# Patient Record
Sex: FEMALE | Race: WHITE | NOT HISPANIC OR LATINO | Employment: PART TIME | URBAN - METROPOLITAN AREA
[De-identification: names, ages, dates, MRNs, and addresses within clinical notes are randomized per-mention and may not be internally consistent; named-entity substitution may affect disease eponyms.]

---

## 2017-10-05 ENCOUNTER — TELEPHONE (OUTPATIENT)
Dept: FAMILY MEDICINE | Facility: CLINIC | Age: 52
End: 2017-10-05

## 2023-06-02 ENCOUNTER — TRANSFERRED RECORDS (OUTPATIENT)
Dept: HEALTH INFORMATION MANAGEMENT | Facility: CLINIC | Age: 58
End: 2023-06-02

## 2023-06-09 ENCOUNTER — TELEPHONE (OUTPATIENT)
Dept: OPHTHALMOLOGY | Facility: CLINIC | Age: 58
End: 2023-06-09

## 2023-06-09 NOTE — TELEPHONE ENCOUNTER
Pt. Was seen this week and small choroidal lesion noted 2 years ago had grown significantly. Was told to be seen by specialist ASAP. Coming Monday at 9:30.    Bindu Pillai COT 11:55 AM June 9, 2023

## 2023-06-09 NOTE — TELEPHONE ENCOUNTER
[facility] Call Center    Phone Message    May a detailed message be left on voicemail: yes     Reason for Call: Appointment Intake    Referring Provider Name: Marc Garcia  Diagnosis and/or Symptoms: Cancer in left eye, possible melanoma.    Pt doesn't have a direct referral but provider insisted she reach out to us.    Per protocol writer is to send te    Action Taken: Message routed to:  [facility] eye    Travel Screening: Not Applicable

## 2023-06-12 ENCOUNTER — PATIENT OUTREACH (OUTPATIENT)
Dept: ONCOLOGY | Facility: CLINIC | Age: 58
End: 2023-06-12

## 2023-06-12 ENCOUNTER — MYC MEDICAL ADVICE (OUTPATIENT)
Dept: OPHTHALMOLOGY | Facility: CLINIC | Age: 58
End: 2023-06-12

## 2023-06-12 ENCOUNTER — OFFICE VISIT (OUTPATIENT)
Dept: OPHTHALMOLOGY | Facility: CLINIC | Age: 58
End: 2023-06-12
Attending: OPHTHALMOLOGY
Payer: COMMERCIAL

## 2023-06-12 DIAGNOSIS — D31.32 CHOROIDAL NEVUS, LEFT EYE: Primary | ICD-10-CM

## 2023-06-12 DIAGNOSIS — C69.32 MALIGNANT MELANOMA OF CHOROID OF LEFT EYE (H): ICD-10-CM

## 2023-06-12 PROCEDURE — G0463 HOSPITAL OUTPT CLINIC VISIT: HCPCS | Performed by: OPHTHALMOLOGY

## 2023-06-12 PROCEDURE — 76510 OPH US DX B-SCAN&QUAN A-SCAN: CPT | Performed by: OPHTHALMOLOGY

## 2023-06-12 PROCEDURE — 99204 OFFICE O/P NEW MOD 45 MIN: CPT | Performed by: OPHTHALMOLOGY

## 2023-06-12 PROCEDURE — 99207 FUNDUS PHOTOS OU (BOTH EYES): CPT | Mod: 26 | Performed by: OPHTHALMOLOGY

## 2023-06-12 PROCEDURE — 92250 FUNDUS PHOTOGRAPHY W/I&R: CPT | Performed by: OPHTHALMOLOGY

## 2023-06-12 PROCEDURE — 92134 CPTRZ OPH DX IMG PST SGM RTA: CPT | Performed by: OPHTHALMOLOGY

## 2023-06-12 ASSESSMENT — CONF VISUAL FIELD
OD_SUPERIOR_TEMPORAL_RESTRICTION: 0
OS_INFERIOR_TEMPORAL_RESTRICTION: 3
OD_NORMAL: 1
OD_INFERIOR_TEMPORAL_RESTRICTION: 0
OD_INFERIOR_NASAL_RESTRICTION: 0
METHOD: COUNTING FINGERS
OD_SUPERIOR_NASAL_RESTRICTION: 0

## 2023-06-12 ASSESSMENT — SLIT LAMP EXAM - LIDS
COMMENTS: NORMAL
COMMENTS: NORMAL

## 2023-06-12 ASSESSMENT — REFRACTION_WEARINGRX
OD_SPHERE: -3.00
OD_CYLINDER: +0.25
OS_CYLINDER: +0.50
OD_AXIS: 173
OS_SPHERE: -2.75
OS_AXIS: 011

## 2023-06-12 ASSESSMENT — TONOMETRY
OD_IOP_MMHG: 19
IOP_METHOD: TONOPEN
OS_IOP_MMHG: 17

## 2023-06-12 ASSESSMENT — EXTERNAL EXAM - RIGHT EYE: OD_EXAM: NORMAL

## 2023-06-12 ASSESSMENT — CUP TO DISC RATIO
OS_RATIO: 0.3
OD_RATIO: 0.3

## 2023-06-12 ASSESSMENT — VISUAL ACUITY
CORRECTION_TYPE: GLASSES
OD_CC: 20/20
METHOD: SNELLEN - LINEAR
OS_CC: 20/20

## 2023-06-12 ASSESSMENT — EXTERNAL EXAM - LEFT EYE: OS_EXAM: NORMAL

## 2023-06-12 NOTE — NURSING NOTE
Chief Complaints and History of Present Illnesses   Patient presents with     Retinal Evaluation     Referral per  PCP Julissa Batista MD and [facility], [address] for Cancer in left eye, possible melanoma.      Chief Complaint(s) and History of Present Illness(es)     Retinal Evaluation            Laterality: both eyes    Associated symptoms: Negative for eye pain, redness, tearing, photophobia, flashes, floaters and swelling    Treatments tried: no treatments    Pain scale: 0/10    Comments: Referral per  PCP Julissa Batista MD and [facility], [address] for Cancer in left eye, possible melanoma.          Comments    Eye meds:   NIHARIKA was 1 week ago, scans show growth of spot on left eye from 2 years previous.  No visual problems per patient.    JOSEPH Smith 6/12/2023 9:43 AM

## 2023-06-12 NOTE — PROGRESS NOTES
New Patient Oncology Nurse Navigator Note     Referring provider:     Madonna Corey MD  [facility]     Referred to (specialty): Medical Oncology & Radiation Oncology (Gamma Knife)    Requested provider (if applicable):   Dr. 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     Clinical History (per Nurse review of records provided):      RETINAL IMAGING:  OCT  06/12/23   OD - macula normal, PHF attached  OS - mild SRF SN macula, PHF attached              periphery - elevated lesion with SRF, choroid abnormal juxtafoveal      U/S OS  A-scan - medium reflective  B-scan - elevated lesion no extension, +choroidal excavation,  size 4.17mm x 11.58T x 10.33L (06/12/23)    Patient being referred for both medical oncology and Gamma knife  Records Location (Care Everywhere, Media, etc.):   Epic      Additional testing needed prior to consult:   CBC d/plts  CMP  CT-Chest/Abdomen/Pelvis with Contrast (CT-CAP)    HOLD: Dr. Schmitt, 7/6/23, [id number], NEW, VIDEO  IB to Gamma Knife team     6/13/23  I called Porsha to discuss referral to medical oncology and Gamma Knife.  I reviewed with her that a CT-CAP and labs are needed prior to her appointment with Dr. Schmitt.  I let her know that he is currently out of the office for the next 2 weeks, but our team will be reaching out early the week of June 26, to get these scheduled.  Plan for her to see Dr. Schmitt on 7/6/23 @ 1287, via video.  I reviewed what to expect at this visit.  I also sent her an email with our clinic location and phone numbers.    She is also being referred to Gamma Knife.  I let her know they will be reaching out separately to discuss appointment with Dr. Delgado.  Porsha has no other questions at this time.      I have sent an in basket message to Dr. Schmitt, and our melanoma navigator, Stormy, to obtain the orders for CT-CAP,  CBC d/p, and CMP, to be done prior to 7/6/23.  I plan to await orders before sending referral on to scheduling to finalize appointment.    6/15/23  Dr. Schmitt placed orders needed.  I forwarded on scheduling instructions to new patient scheduling to complete.  Gamma Knife indicates they will schedule for that appointment.

## 2023-06-12 NOTE — PROGRESS NOTES
"CC -   CMM OS    INTERVAL HISTORY - Initial visit with     [facility] -   Porsha AIKEN Darien is a  58 year old year-old patient with history of CMM OS diagnosed 2023    Had eye exam in 5/2021 showing nevus OS, returned 5/2023 and lesion much larger  No vision symptoms  Recalls was told about \"shadow\" in eye in years past as well    No DM, no HTn, no prior CA    PAST OCULAR SURGERY  None      RETINAL IMAGING:  OCT  06/12/23   OD - macula normal, PHF attached  OS - mild SRF SN macula, PHF attached   periphery - elevated lesion with SRF, choroid abnormal juxtafoveal      U/S OS  A-scan - medium reflective  B-scan - elevated lesion no extension, +choroidal excavation,  size 4.17mm x 11.58T x 10.33L (06/12/23)      ASSESSMENT & PLAN    # CMM OS   - very likely based on imaging   - substantial size growth 2021 to 2023 (images viewed on patient's phone, will upload to [facility])   - size 4 mm, +orange, +SRF     - juxtapapillary, on OCT choroidal abnormalities very close to ON   - would favor  gamma knife instead of plaque Tx d/t juxtapapillary location   - would consider tumor biopsy prior to GK     - r/b/a enucleation vs GK and biopsy d/w patient as below:    I discussed choroidal malignant melanomas with the patient and the treatment options. I explained that the COM Study showed that the long-term survival rate was equivalent with brachytherapy or enucleation and that many patients chose brachytherapy so that they could keep their eye. I explained that gamma knife radiation was also an option  and that the success rate was equivalent to brachytherapy. I explained that with radiation therapy there was a 90-95 percent success rate of making the tumor stop growing. I explained that if the tumor did not respond to the radiation then enucleation would probably be the best option. I explained that treatment did improve survivability and that long-term life expectancy would be determined by whether the tumor metastasized or not. I explained " that once metastasis occurred that the life expectancy subsequently was poor and that the patient would need monitoring to look for metastases subsequent to treatment. I advised the patient that the average survival at 10 to 15 years was 50 percent. I explained that with radiation treatment the patient would need to return indefinitely for serial visits to ensure that the tumor did not resume growing. I explained that with the radiation therapy there could be damage to the lens, the optic nerve and to the macula that could in the long run cause severe vision loss. I explained that this vision loss typically manifested within one to two years after treatment, but could manifest much sooner  I did offer the patient the possibility of a biopsy of the tumor before gamma knife, intraoperatively during plaque placement or after enucleation. I said this could predict the long-term prognosis more precisely, help tailor monitoring after treatment,  and possibly allow participation in clinical studies if the patient was interested. I did explain that currently there are no proven treatments should his biopsy results come back positive for an aggressive tumor.  I explained that with a biopsy, there was a risk of external tumor seeding which might require exenteration, a very disfiguring surgery. I explained a biopsy could cause a retinal detachment or vitreous hemorrhage, which could result in permanent severe vision loss because they would not be repaired for up to a year  until the tumor was seen to have responded to radiation.   I explained that as part of the plaque placement, it might be necessary to relocate extraocular muscles.  I explained that this could sometimes cause diplopia, which, while usually only temporary, could sometimes require special glasses, surgery, or even become permanent.  I explained that this is a training facility, and residents or fellows might participate in the surgery under  supervision.          ATTESTATION     Attending Physician Attestation:      Complete documentation of historical and exam elements from today's encounter can be found in the full encounter summary report (not reduplicated in this progress note).  I personally obtained the chief complaint(s) and history of present illness.  I confirmed and edited as necessary the review of systems, past medical/surgical history, family history, social history, and examination findings as documented by others; and I examined the patient myself.  I personally reviewed the relevant tests, images, and reports as documented above.  I formulated and edited as necessary the assessment and plan and discussed the findings and management plan with the patient and family    Madonna Corey MD, PhD  , Vitreoretinal Surgery  Department of Ophthalmology  [facility]

## 2023-06-14 DIAGNOSIS — C69.40 MALIGNANT MELANOMA OF UVEA, UNSPECIFIED LATERALITY (H): Primary | ICD-10-CM

## 2023-06-19 ENCOUNTER — TELEPHONE (OUTPATIENT)
Dept: OPHTHALMOLOGY | Facility: CLINIC | Age: 58
End: 2023-06-19

## 2023-06-19 DIAGNOSIS — C69.32 CHOROID MELANOMA OF LEFT EYE (H): Primary | ICD-10-CM

## 2023-06-19 NOTE — TELEPHONE ENCOUNTER
I called Porsha to schedule PAC clinic and surgery with Dr. Madonna Corey, I left a voicemail with callback # [phone].     Porsha would like to wait on scheduling the biopsy at this time. Depending on a few factors Porsha said she may have some tough decisions to make so she would like the PAC clinic after her 07/06 appointment so she can cancel if not going forward with Gamma Knife. Scheduled a PAC appointment for 07/07 and provided direct line [phone].

## 2023-06-20 NOTE — TELEPHONE ENCOUNTER
FUTURE VISIT INFORMATION      SURGERY INFORMATION:    Date: 7/17/23    Location: uu or    Surgeon:  Madonna Corey MD Chen, MD Sandy Tierney, Kia SEGURA MD    Anesthesia Type:  general    Procedure: Left Eye Immobilization @0730 Placement, Head Frame Anesthesia In OR Magnetic Resonance Imaging Brain @0830 ANESTHESIA OUT OF OR planning from 9-1030, treatment from [id number] in Radiation Oncology, back to PACU @1300    Consult: ov 6/12/23    RECORDS REQUESTED FROM:       Primary Care Provider: Julissa Batista MD    Pertinent Medical History: hypertension

## 2023-06-20 NOTE — TELEPHONE ENCOUNTER
RECORDS RECEIVED FROM: Internal   REASON FOR VISIT: Gamma Knife consult   Date of Appt: 7/14/23 11:15am    NOTES (FOR ALL VISITS) STATUS DETAILS   OFFICE NOTE from referring provider Internal 6/12/23 Madonna Corey MD @[facility]     MEDICATION LIST Internal    IMAGING  (FOR ALL VISITS)     MRI (HEAD, NECK, SPINE) In process [facility]  Scheduled 7/17/23 MR Brain

## 2023-06-22 ENCOUNTER — LAB (OUTPATIENT)
Dept: LAB | Facility: CLINIC | Age: 58
End: 2023-06-22
Payer: COMMERCIAL

## 2023-06-22 ENCOUNTER — ANCILLARY PROCEDURE (OUTPATIENT)
Dept: CT IMAGING | Facility: CLINIC | Age: 58
End: 2023-06-22
Attending: INTERNAL MEDICINE
Payer: COMMERCIAL

## 2023-06-22 DIAGNOSIS — C69.40 MALIGNANT MELANOMA OF UVEA, UNSPECIFIED LATERALITY (H): ICD-10-CM

## 2023-06-22 LAB
ALBUMIN SERPL BCG-MCNC: 3.6 G/DL (ref 3.5–5.2)
ALP SERPL-CCNC: 60 U/L (ref 35–104)
ALT SERPL W P-5'-P-CCNC: 11 U/L (ref 0–50)
ANION GAP SERPL CALCULATED.3IONS-SCNC: 6 MMOL/L (ref 7–15)
AST SERPL W P-5'-P-CCNC: 17 U/L (ref 0–45)
BASOPHILS # BLD AUTO: 0 10E3/UL (ref 0–0.2)
BASOPHILS NFR BLD AUTO: 1 %
BILIRUB SERPL-MCNC: 0.6 MG/DL
BUN SERPL-MCNC: 13.8 MG/DL (ref 8–23)
CALCIUM SERPL-MCNC: 9 MG/DL (ref 8.8–10.2)
CHLORIDE SERPL-SCNC: 105 MMOL/L (ref 98–107)
CREAT SERPL-MCNC: 0.76 MG/DL (ref 0.51–0.95)
DEPRECATED HCO3 PLAS-SCNC: 28 MMOL/L (ref 22–29)
EOSINOPHIL # BLD AUTO: 0.1 10E3/UL (ref 0–0.7)
EOSINOPHIL NFR BLD AUTO: 2 %
ERYTHROCYTE [DISTWIDTH] IN BLOOD BY AUTOMATED COUNT: 13.1 % (ref 10–15)
GFR SERPL CREATININE-BSD FRML MDRD: 89 ML/MIN/1.73M2
GLUCOSE SERPL-MCNC: 116 MG/DL (ref 70–99)
HCT VFR BLD AUTO: 36.2 % (ref 35–47)
HGB BLD-MCNC: 11.8 G/DL (ref 11.7–15.7)
IMM GRANULOCYTES # BLD: 0 10E3/UL
IMM GRANULOCYTES NFR BLD: 0 %
LYMPHOCYTES # BLD AUTO: 1.2 10E3/UL (ref 0.8–5.3)
LYMPHOCYTES NFR BLD AUTO: 26 %
MCH RBC QN AUTO: 28.5 PG (ref 26.5–33)
MCHC RBC AUTO-ENTMCNC: 32.6 G/DL (ref 31.5–36.5)
MCV RBC AUTO: 87 FL (ref 78–100)
MONOCYTES # BLD AUTO: 0.5 10E3/UL (ref 0–1.3)
MONOCYTES NFR BLD AUTO: 10 %
NEUTROPHILS # BLD AUTO: 2.9 10E3/UL (ref 1.6–8.3)
NEUTROPHILS NFR BLD AUTO: 61 %
NRBC # BLD AUTO: 0 10E3/UL
NRBC BLD AUTO-RTO: 0 /100
PLATELET # BLD AUTO: 159 10E3/UL (ref 150–450)
POTASSIUM SERPL-SCNC: 3.6 MMOL/L (ref 3.4–5.3)
PROT SERPL-MCNC: 5.8 G/DL (ref 6.4–8.3)
RBC # BLD AUTO: 4.14 10E6/UL (ref 3.8–5.2)
SODIUM SERPL-SCNC: 139 MMOL/L (ref 136–145)
WBC # BLD AUTO: 4.7 10E3/UL (ref 4–11)

## 2023-06-22 PROCEDURE — 71260 CT THORAX DX C+: CPT | Performed by: RADIOLOGY

## 2023-06-22 PROCEDURE — 80053 COMPREHEN METABOLIC PANEL: CPT | Performed by: PATHOLOGY

## 2023-06-22 PROCEDURE — 85025 COMPLETE CBC W/AUTO DIFF WBC: CPT | Performed by: PATHOLOGY

## 2023-06-22 PROCEDURE — 36415 COLL VENOUS BLD VENIPUNCTURE: CPT | Performed by: PATHOLOGY

## 2023-06-22 PROCEDURE — 74177 CT ABD & PELVIS W/CONTRAST: CPT | Performed by: RADIOLOGY

## 2023-06-22 RX ORDER — IOPAMIDOL 755 MG/ML
122 INJECTION, SOLUTION INTRAVASCULAR ONCE
Status: COMPLETED | OUTPATIENT
Start: 2023-06-22 | End: 2023-06-22

## 2023-06-22 RX ADMIN — IOPAMIDOL 122 ML: 755 INJECTION, SOLUTION INTRAVASCULAR at 12:49

## 2023-06-30 DIAGNOSIS — C69.32 CHOROIDAL MALIGNANT MELANOMA, LEFT (H): Primary | ICD-10-CM

## 2023-07-03 ENCOUNTER — PRE VISIT (OUTPATIENT)
Dept: RADIATION ONCOLOGY | Facility: CLINIC | Age: 58
End: 2023-07-03

## 2023-07-03 NOTE — NURSING NOTE
"Date: 7/3/2023   Age: 58 year old  Ethnicity:    Sex: female  : 1965   Lives In: [place], [place]      Diagnosis: Left Choroidal Melanoma    Prior radiation therapy:   Site Treated: at  Facility: at  Dates: at  Dose: at    Site Treated: at  Facility: at  Dates: at  Dose: at    Prior chemotherapy:   Protocol: at  Facility: at  Dates: at    RN time with patient:  Educated on Gamma Knife;    Doctors: Dr. Jose Schmitt, Dr. Madonna Corey, Dr. Kike Reynoso,     Pain at time of consult:  Is patient pregnant: Age 58  Does pt have a living will:  Does pt have implanted cardiac device:    Has pt fallen in past week:  Does pt feel steady on her feet:       Review Since Diagnosis:    Pt recalls being told had \"shadow\" in left eye     May 2021; pt had eye exam, this showed a nevus left eye     May 2023; return eye exam, nevus left eye increased in size     23; pt saw Dr. Corey in consult,  Eye exam ultrasound showed on B Scan elevated lesion no extension, + choroidal excavation Size: 4.17mm x 11.58 mmT x 10.33 mmL.  Discussed possible biopsy, pt unsure, visit with Dr. Schmitt about before decide.  Presented options, Dr. Corey favoring Gamma Knife due to juxtapapillary location     23; CT CAP, Indeterminate 2 cm left adrenal nodule, indeterminate bilateral renal masses measuring up to 1 cm (MRI Abd recommended).  No other evidence mets in CAP    23; Pelvic Transabdominal and Transvaginal Ultrasound, showed a uterine fibroid and left ovarian cyst     23; pt video visit with Dr. Schmitt, Pt stating not really wanting a separate biopsy separate from the Gamma Knife treatment.  Bilateral renal masses, Dr. Schmitt wants MRI Abdomen.  No visual symptoms.  Plan to see every 6 months with scans    23; MRI Abdomen    23; to consult with Dr. Reynoso,     Chief Complaint: at consult   "

## 2023-07-03 NOTE — TELEPHONE ENCOUNTER
RECORDS STATUS - ALL OTHER DIAGNOSIS      RECORDS RECEIVED FROM: Epic   DATE RECEIVED:    NOTES STATUS DETAILS   OFFICE NOTE from referring provider Epic Dr. Madonna Corey   MEDICATION LIST [facility]    LABS     ANYTHING RELATED TO DIAGNOSIS Epic Most recent from 6/22/23   IMAGING (NEED IMAGES & REPORT)     CT SCANS PACS 6/22/23: CT Chest Abd Pel   MRI (scheduled for 7/14/23 & 7/17/23) MR Abdomen  MR Brain   ULTRASOUND PACS 7/5/23: US Pelvic

## 2023-07-03 NOTE — TELEPHONE ENCOUNTER
MEDICAL RECORDS REQUEST   Radiation Oncology  [address] [place] [address]  Fax: [phone]          FUTURE VISIT INFORMATION                                                   Porsha Ledezma, : 1965 scheduled for future visit at [facility] Radiation Oncology    RECORDS REQUESTED FOR VISIT                                                     HEAD & NECK     OFFICE NOTE from medical oncologist Epic 23: Dr. Sg Schmitt   OFFICE NOTE from ENT [facility] 23: Dr. Madonna Corey   MEDICATION LIST [facility]    LABS     ANYTHING RELATED TO DIAGNOSIS Epic Most recent from 23   IMAGING (NEED IMAGES & REPORT)     CT SCANS PACS 23: CT Chest Abd Pel   MRI scheduled for 23 & 23) MR Abdomen  MR Brain   ULTRASOUND PACS 23: US Pelvic

## 2023-07-05 ENCOUNTER — ANCILLARY ORDERS (OUTPATIENT)
Dept: ONCOLOGY | Facility: CLINIC | Age: 58
End: 2023-07-05

## 2023-07-05 ENCOUNTER — ANCILLARY PROCEDURE (OUTPATIENT)
Dept: ULTRASOUND IMAGING | Facility: CLINIC | Age: 58
End: 2023-07-05
Attending: INTERNAL MEDICINE
Payer: COMMERCIAL

## 2023-07-05 DIAGNOSIS — C69.32 CHOROIDAL MALIGNANT MELANOMA, LEFT (H): ICD-10-CM

## 2023-07-05 PROCEDURE — 76830 TRANSVAGINAL US NON-OB: CPT | Performed by: RADIOLOGY

## 2023-07-05 PROCEDURE — 76856 US EXAM PELVIC COMPLETE: CPT | Performed by: RADIOLOGY

## 2023-07-06 ENCOUNTER — VIRTUAL VISIT (OUTPATIENT)
Dept: ONCOLOGY | Facility: CLINIC | Age: 58
End: 2023-07-06
Attending: OPHTHALMOLOGY
Payer: COMMERCIAL

## 2023-07-06 ENCOUNTER — PRE VISIT (OUTPATIENT)
Dept: ONCOLOGY | Facility: CLINIC | Age: 58
End: 2023-07-06

## 2023-07-06 VITALS — WEIGHT: 210 LBS | BODY MASS INDEX: 34.99 KG/M2 | HEIGHT: 65 IN

## 2023-07-06 DIAGNOSIS — C69.32 MALIGNANT MELANOMA OF CHOROID OF LEFT EYE (H): ICD-10-CM

## 2023-07-06 PROCEDURE — 99205 OFFICE O/P NEW HI 60 MIN: CPT | Mod: VID | Performed by: INTERNAL MEDICINE

## 2023-07-06 ASSESSMENT — PAIN SCALES - GENERAL: PAINLEVEL: NO PAIN (0)

## 2023-07-06 NOTE — LETTER
7/6/2023         RE: Porsha Ledezma  [address]  [facility] [address]        Dear Colleague,    Thank you for referring your patient, Porsha Ledezma, to the [facility]. Please see a copy of my visit note below.    Virtual Visit Details    Type of service:  Video Visit   Video Start Time: 11:00 AM  Video End Time:11:30 AM    Originating Location (pt. Location): Home    Distant Location (provider location):  Off-site  Platform used for Video Visit: [facility]      MEDICAL ONCOLOGY CONSULT  Melanoma Clinic  Jul 6, 2023      ASSESSMENT/PLAN:    #1 Choroidal melanoma, left eye, cT2 N0 M0, Stage IIA  It was a pleasure to meet Ms. Ledezma. She is a very pleasant 58 year old woman with medium sized choroidal melanoma of the left eye. We reviewed the diagnosis of choroidal melanoma and the need for follow-up. Introduced my role on the team in monitoring her for development of metastatic disease. She made it clear in our discussion that she values quality of life over quantity of life. The plan is to proceed with gamma knife radiation and she is not keen to have separate biopsy done.     I would recommend every 6 month visits and scans based on the size of the tumor. For follow-up I would recommend we obtain CT-chest and MRI-abdomen every 6 months, which seems reasonable to her.     -RTC in 6 months with Ct-chest and MRI-abdomen and labs    #2 Bilateral renal masses  She has a 1 cm mass in the right uper pole of the kidney with indeterminate density. There is also a subcentimeter lesion int he left upper pole of the kidney. MRI-abdomen recommended and to be obtained on 7/14. I will plan to call the patient on 7/14 or 7/15 to follow-up the results.  -call patient to review MRI-abdomen results    #3 Left adnexal cyst  Measured 3.5 cm on Ct-scan. Pelvic ultrasound shows 2.9 cm left ovarian cyst without solid components or septations. No follow up required per SRU guidelines.      Evidio  TONI Gunter.   of Medicine  Hematology, Oncology and Transplantation  Pager: [phone]    80 minutes spent on the date of the encounter doing chart review, history and exam, documentation and further activities as noted above.    ------------------------------------------------------------    Chief Complaint: Choroidal melanoma, left eye    History of Present Illness:  Porsha Ledezma is a 58 year old female with choroidal melanoma of the left eye.     Several years ago was told she had a shadow in the eye.  May 2021; pt had eye exam, this showed a nevus left eye   May 2023; return eye exam, nevus left eye increased in size   6/12/23; pt saw Dr. Corey in consult. OCT shows elevated lesion with SRF, and juxtafoveal location. B-scan ultrasound showed elevated lesion no extension, + choroidal excavation Size: 4.17mm x 11.58 mmT x 10.33 mmL. Presented options, Dr. Corey favoring Gamma Knife due to juxtapapillary location   6/22/23; CT CAP shows indeterminate 2 cm left adrenal nodule, indeterminate bilateral renal masses measuring up to 1 cm (MRI Abd recommended).  No other evidence of metastasis.  7/5/23; Pelvic ultrasound shows 2.9 cm left ovarian cyst without solid components or septations. No follow up required per SRU guidelines.    She has no visual symptoms. Overall has no complaints. She does a good job eliminating stress from her life. She has no children. She values quality of life over quantity of life. Has had gaps in medical coverage in the past and she is behind on her mammograms and colonoscopy.      Current Outpatient Medications   Medication Sig Dispense Refill    amLODIPine (NORVASC) 5 MG tablet Take 1 tablet (5 mg) by mouth daily 90 tablet 3    buPROPion (WELLBUTRIN XL) 300 MG 24 hr tablet       DULoxetine (CYMBALTA) 60 MG capsule       hydrocortisone (WESTCORT) 0.2 % cream Apply sparingly to affected area three times daily as needed. 45 g 2    miconazole  (MICATIN) 2 % cream Apply topically 2 times daily For under breasts and tummy 42 g 1    ORDER FOR DME Equipment being ordered: BP monitor with cuff 1 Units 0    ORDER FOR DME BP cuff, brand as covered by insurance.  Dx: HTN 1 each 0    ORDER FOR DME BP cuff, brand as covered by insurance.  Dx: HTN 1 each 0       Allergies   Allergen Reactions    Ace Inhibitors Cough     And also gastroenterology distress - tried two diffent    Bee Venom     Codeine     Penicillins      Immunization History   Administered Date(s) Administered    COVID-19 Bivalent 12+ (Pfizer) 09/25/2022    COVID-19 Monovalent 12+ (Pfizer 2022) 05/23/2022    TDAP (Adacel,Boostrix) 11/10/2009       Past Medical History:   Diagnosis Date    ASCUS favor benign 04/01/2015    neg HPV. cotest in 3 years    Choroidal malignant melanoma, left (H)     Hypertension        No past surgical history on file.    Social History: Unmarried, no children. 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  History   Smoking Status    Former    Packs/day: 0.50    Types: Cigarettes    Quit date: 10/2020   Smokeless Tobacco    Never    Social History    Substance and Sexual Activity      Alcohol use: Not Currently        Comment: quit drinking 9/2020     History   Drug Use No       Family History:  Family History   Problem Relation Age of Onset    Heart Disease Mother     Alzheimer Disease Mother     Heart Disease Father     Prostate Cancer Father        Physical Examination:  LMP  (LMP Unknown)   Wt Readings from Last 5 Encounters:   04/14/16 105.7 kg (233 lb)   12/05/14 105 kg (231 lb 6.4 oz)   07/31/14 98.8 kg (217 lb 12.8 oz)   06/19/14 100.2 kg (221 lb)   05/15/14 98 kg (216 lb)     GENERAL: Healthy, alert and no distress  EYES: Eyes grossly normal to inspection.  No discharge or erythema, or obvious scleral/conjunctival abnormalities.  RESP: No audible wheeze, cough, or visible cyanosis.  No visible retractions or increased work of breathing.    SKIN: Visible skin  clear. No significant rash, abnormal pigmentation or lesions.  NEURO: Cranial nerves grossly intact.  Mentation and speech appropriate for age.  PSYCH: Mentation appears normal, affect normal/bright, judgement and insight intact, normal speech and appearance well-groomed.      Laboratory Data:  Lab on 06/22/2023   Component Date Value Ref Range Status    Sodium 06/22/2023 139  136 - 145 mmol/L Final    Potassium 06/22/2023 3.6  3.4 - 5.3 mmol/L Final    Chloride 06/22/2023 105  98 - 107 mmol/L Final    Carbon Dioxide (CO2) 06/22/2023 28  22 - 29 mmol/L Final    Anion Gap 06/22/2023 6 (L)  7 - 15 mmol/L Final    Urea Nitrogen 06/22/2023 13.8  8.0 - 23.0 mg/dL Final    Creatinine 06/22/2023 0.76  0.51 - 0.95 mg/dL Final    Calcium 06/22/2023 9.0  8.8 - 10.2 mg/dL Final    Glucose 06/22/2023 116 (H)  70 - 99 mg/dL Final    Alkaline Phosphatase 06/22/2023 60  35 - 104 U/L Final    AST 06/22/2023 17  0 - 45 U/L Final    ALT 06/22/2023 11  0 - 50 U/L Final    Protein Total 06/22/2023 5.8 (L)  6.4 - 8.3 g/dL Final    Albumin 06/22/2023 3.6  3.5 - 5.2 g/dL Final    Bilirubin Total 06/22/2023 0.6  <=1.2 mg/dL Final    GFR Estimate 06/22/2023 89  >60 mL/min/1.73m2 Final    WBC Count 06/22/2023 4.7  4.0 - 11.0 10e3/uL Final    RBC Count 06/22/2023 4.14  3.80 - 5.20 10e6/uL Final    Hemoglobin 06/22/2023 11.8  11.7 - 15.7 g/dL Final    Hematocrit 06/22/2023 36.2  35.0 - 47.0 % Final    MCV 06/22/2023 87  78 - 100 fL Final    MCH 06/22/2023 28.5  26.5 - 33.0 pg Final    MCHC 06/22/2023 32.6  31.5 - 36.5 g/dL Final    RDW 06/22/2023 13.1  10.0 - 15.0 % Final    Platelet Count 06/22/2023 159  150 - 450 10e3/uL Final    % Neutrophils 06/22/2023 61  % Final    % Lymphocytes 06/22/2023 26  % Final    % Monocytes 06/22/2023 10  % Final    % Eosinophils 06/22/2023 2  % Final    % Basophils 06/22/2023 1  % Final    % Immature Granulocytes 06/22/2023 0  % Final    NRBCs per 100 WBC 06/22/2023 0  <1 /100 Final    Absolute Neutrophils  06/22/2023 2.9  1.6 - 8.3 10e3/uL Final    Absolute Lymphocytes 06/22/2023 1.2  0.8 - 5.3 10e3/uL Final    Absolute Monocytes 06/22/2023 0.5  0.0 - 1.3 10e3/uL Final    Absolute Eosinophils 06/22/2023 0.1  0.0 - 0.7 10e3/uL Final    Absolute Basophils 06/22/2023 0.0  0.0 - 0.2 10e3/uL Final    Absolute Immature Granulocytes 06/22/2023 0.0  <=0.4 10e3/uL Final    Absolute NRBCs 06/22/2023 0.0  10e3/uL Final     I personally reviewed the above labs.      Imaging Studies:  US Pelvic Complete with Transvaginal  Narrative: EXAMINATION: US PELVIC TRANSABDOMINAL AND TRANSVAGINAL, 7/5/2023 12:25  PM     COMPARISON: CT chest abdomen and pelvis 6/22/2023    HISTORY: Choroidal malignant melanoma, left (H)    TECHNIQUE: The pelvis was scanned in standard fashion with  transabdominal and transvaginal transducer(s) using both grey scale  and limited color Doppler techniques.    FINDINGS:  The uterus measures 4.1 x 4.1 x 2.4 cm, and there is anterior  intramural fibroid measuring 1.6 cm.  The endometrium is within normal  limits and measures 2 mm. There is no free fluid in the pelvis.    The right ovary measures 2.1 x 1 x 2.1 cm and the left ovary measures  3.4 x 3.1 x 2.7 cm. There is a left ovarian cyst measuring 2.9 cm  without solid components or septations. There is normal blood flow to  the ovaries.  Impression: IMPRESSION:   1.  Uterine fibroid.  2.  2.9 cm left ovarian cyst without solid components or septations.  No follow up required per SRU guidelines.    Reference:  Simple Adnexal Cysts: SRU Consensus Conference Update on Follow-up and  Reporting  Pamela Weber et al.  [email or web address]    RJ CARRANZA MD         SYSTEM ID:  [id number]    EXAM: CT CHEST/ABDOMEN/PELVIS W CONTRAST  LOCATION: [facility]  DATE: 6/22/2023     INDICATION:  Malignant melanoma of uvea, unspecified laterality (H)  COMPARISON: None.  TECHNIQUE: CT scan of the chest, abdomen,  and pelvis was performed following injection of IV contrast. Multiplanar reformats were obtained. Dose reduction techniques were used.   CONTRAST: Isovue 370 122cc     FINDINGS:   LUNGS AND PLEURA: Mild linear atelectasis left lower lobe. Lungs otherwise clear. No pleural effusion.     MEDIASTINUM/AXILLAE: Normal.     CORONARY ARTERY CALCIFICATION: Mild.     HEPATOBILIARY: Normal.     PANCREAS: Normal.     SPLEEN: Normal.     ADRENAL GLANDS: Left adrenal nodule of indeterminate density measures 2.0 x 1.4 cm. Normal right adrenal gland.     KIDNEYS/BLADDER: 1 cm mass of right upper pole kidney has indeterminate density (series 5 image 127). Subcentimeter lesion left upper pole kidney posteriorly also indeterminate (series 5 image 104). No hydronephrosis. No bladder wall thickening.     BOWEL: Normal.     LYMPH NODES: Normal.     VASCULATURE: Unremarkable.     PELVIC ORGANS: 3.5 cm left adnexal cyst.     MUSCULOSKELETAL: Multilevel degenerative changes mid thoracic and lower lumbar spine.                                                                      IMPRESSION:  1.  Indeterminate 2 cm left adrenal nodule.  2.  Indeterminate bilateral renal masses measuring up to 1 cm.  3.  Recommend MRI abdomen for characterization of above findings.  4.  No other evidence to suggest metastatic disease in the chest, abdomen or pelvis.  5.  3.5 cm left adnexal cyst. Recommend pelvic ultrasound.        REFERENCE:  Management of Incidental Adnexal Findings on CT and MRI: A White Paper of the ACR Incidental Findings Committee. J Am Mitchell Radiol 2020; 17(2):248-254.     Postmenopausal or equal to or >50 years if status unknown:  >3 cm on CT: Ultrasound.    I personally reviewed the above imaging in PACS.        Sg Schmitt MD

## 2023-07-06 NOTE — NURSING NOTE
Is the patient currently in the state of [place]? YES    Visit mode:VIDEO    If the visit is dropped, the patient can be reconnected by: VIDEO VISIT: Text to cell phone: [phone]    Will anyone else be joining the visit? NO      How would you like to obtain your AVS? MyChart     Are changes needed to the allergy or medication list? NO    Patient denies any changes since echeck-in regarding medication and allergies and states all information entered during echeck-in remains accurate.    Reason for visit: Consult    Ten LEMUS

## 2023-07-06 NOTE — PROGRESS NOTES
Virtual Visit Details    Type of service:  Video Visit   Video Start Time: 11:00 AM  Video End Time:11:30 AM    Originating Location (pt. Location): Home    Distant Location (provider location):  Off-site  Platform used for Video Visit: [facility] ONCOLOGY CONSULT  Melanoma Clinic  Jul 6, 2023      ASSESSMENT/PLAN:    #1 Choroidal melanoma, left eye, cT2 N0 M0, Stage IIA  It was a pleasure to meet Ms. Ledezma. She is a very pleasant 58 year old woman with medium sized choroidal melanoma of the left eye. We reviewed the diagnosis of choroidal melanoma and the need for follow-up. Introduced my role on the team in monitoring her for development of metastatic disease. She made it clear in our discussion that she values quality of life over quantity of life. The plan is to proceed with gamma knife radiation and she is not keen to have separate biopsy done.     I would recommend every 6 month visits and scans based on the size of the tumor. For follow-up I would recommend we obtain CT-chest and MRI-abdomen every 6 months, which seems reasonable to her.     -RTC in 6 months with Ct-chest and MRI-abdomen and labs    #2 Bilateral renal masses  She has a 1 cm mass in the right uper pole of the kidney with indeterminate density. There is also a subcentimeter lesion int he left upper pole of the kidney. MRI-abdomen recommended and to be obtained on 7/14. I will plan to call the patient on 7/14 or 7/15 to follow-up the results.  -call patient to review MRI-abdomen results    #3 Left adnexal cyst  Measured 3.5 cm on Ct-scan. Pelvic ultrasound shows 2.9 cm left ovarian cyst without solid components or septations. No follow up required per SRU guidelines.      Sg Gutner M.D.   of Medicine  Hematology, Oncology and Transplantation  Pager: [phone]    80 minutes spent on the date of the encounter doing chart review, history and exam, documentation and further activities as noted  above.    ------------------------------------------------------------    Chief Complaint: Choroidal melanoma, left eye    History of Present Illness:  Porsha Ledezma is a 58 year old female with choroidal melanoma of the left eye.     Several years ago was told she had a shadow in the eye.  May 2021; pt had eye exam, this showed a nevus left eye   May 2023; return eye exam, nevus left eye increased in size   6/12/23; pt saw Dr. Corey in consult. OCT shows elevated lesion with SRF, and juxtafoveal location. B-scan ultrasound showed elevated lesion no extension, + choroidal excavation Size: 4.17mm x 11.58 mmT x 10.33 mmL. Presented options, Dr. Corey favoring Gamma Knife due to juxtapapillary location   6/22/23; CT CAP shows indeterminate 2 cm left adrenal nodule, indeterminate bilateral renal masses measuring up to 1 cm (MRI Abd recommended).  No other evidence of metastasis.  7/5/23; Pelvic ultrasound shows 2.9 cm left ovarian cyst without solid components or septations. No follow up required per SRU guidelines.    She has no visual symptoms. Overall has no complaints. She does a good job eliminating stress from her life. She has no children. She values quality of life over quantity of life. Has had gaps in medical coverage in the past and she is behind on her mammograms and colonoscopy.      Current Outpatient Medications   Medication Sig Dispense Refill     amLODIPine (NORVASC) 5 MG tablet Take 1 tablet (5 mg) by mouth daily 90 tablet 3     buPROPion (WELLBUTRIN XL) 300 MG 24 hr tablet        DULoxetine (CYMBALTA) 60 MG capsule        hydrocortisone (WESTCORT) 0.2 % cream Apply sparingly to affected area three times daily as needed. 45 g 2     miconazole (MICATIN) 2 % cream Apply topically 2 times daily For under breasts and tummy 42 g 1     ORDER FOR DME Equipment being ordered: BP monitor with cuff 1 Units 0     ORDER FOR DME BP cuff, brand as covered by insurance.  Dx: HTN 1 each 0     ORDER FOR  DME BP cuff, brand as covered by insurance.  Dx: HTN 1 each 0       Allergies   Allergen Reactions     Ace Inhibitors Cough     And also gastroenterology distress - tried two diffent     Bee Venom      Codeine      Penicillins      Immunization History   Administered Date(s) Administered     COVID-19 Bivalent 12+ (Pfizer) 09/25/2022     COVID-19 Monovalent 12+ (Pfizer 2022) 05/23/2022     TDAP (Adacel,Boostrix) 11/10/2009       Past Medical History:   Diagnosis Date     ASCUS favor benign 04/01/2015    neg HPV. cotest in 3 years     Choroidal malignant melanoma, left (H)      Hypertension        No past surgical history on file.    Social History: Unmarried, no children. 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  History   Smoking Status     Former     Packs/day: 0.50     Types: Cigarettes     Quit date: 10/2020   Smokeless Tobacco     Never    Social History    Substance and Sexual Activity      Alcohol use: Not Currently        Comment: quit drinking 9/2020     History   Drug Use No       Family History:  Family History   Problem Relation Age of Onset     Heart Disease Mother      Alzheimer Disease Mother      Heart Disease Father      Prostate Cancer Father        Physical Examination:  LMP  (LMP Unknown)   Wt Readings from Last 5 Encounters:   04/14/16 105.7 kg (233 lb)   12/05/14 105 kg (231 lb 6.4 oz)   07/31/14 98.8 kg (217 lb 12.8 oz)   06/19/14 100.2 kg (221 lb)   05/15/14 98 kg (216 lb)     GENERAL: Healthy, alert and no distress  EYES: Eyes grossly normal to inspection.  No discharge or erythema, or obvious scleral/conjunctival abnormalities.  RESP: No audible wheeze, cough, or visible cyanosis.  No visible retractions or increased work of breathing.    SKIN: Visible skin clear. No significant rash, abnormal pigmentation or lesions.  NEURO: Cranial nerves grossly intact.  Mentation and speech appropriate for age.  PSYCH: Mentation appears normal, affect normal/bright, judgement and insight intact,  normal speech and appearance well-groomed.      Laboratory Data:  Lab on 06/22/2023   Component Date Value Ref Range Status     Sodium 06/22/2023 139  136 - 145 mmol/L Final     Potassium 06/22/2023 3.6  3.4 - 5.3 mmol/L Final     Chloride 06/22/2023 105  98 - 107 mmol/L Final     Carbon Dioxide (CO2) 06/22/2023 28  22 - 29 mmol/L Final     Anion Gap 06/22/2023 6 (L)  7 - 15 mmol/L Final     Urea Nitrogen 06/22/2023 13.8  8.0 - 23.0 mg/dL Final     Creatinine 06/22/2023 0.76  0.51 - 0.95 mg/dL Final     Calcium 06/22/2023 9.0  8.8 - 10.2 mg/dL Final     Glucose 06/22/2023 116 (H)  70 - 99 mg/dL Final     Alkaline Phosphatase 06/22/2023 60  35 - 104 U/L Final     AST 06/22/2023 17  0 - 45 U/L Final     ALT 06/22/2023 11  0 - 50 U/L Final     Protein Total 06/22/2023 5.8 (L)  6.4 - 8.3 g/dL Final     Albumin 06/22/2023 3.6  3.5 - 5.2 g/dL Final     Bilirubin Total 06/22/2023 0.6  <=1.2 mg/dL Final     GFR Estimate 06/22/2023 89  >60 mL/min/1.73m2 Final     WBC Count 06/22/2023 4.7  4.0 - 11.0 10e3/uL Final     RBC Count 06/22/2023 4.14  3.80 - 5.20 10e6/uL Final     Hemoglobin 06/22/2023 11.8  11.7 - 15.7 g/dL Final     Hematocrit 06/22/2023 36.2  35.0 - 47.0 % Final     MCV 06/22/2023 87  78 - 100 fL Final     MCH 06/22/2023 28.5  26.5 - 33.0 pg Final     MCHC 06/22/2023 32.6  31.5 - 36.5 g/dL Final     RDW 06/22/2023 13.1  10.0 - 15.0 % Final     Platelet Count 06/22/2023 159  150 - 450 10e3/uL Final     % Neutrophils 06/22/2023 61  % Final     % Lymphocytes 06/22/2023 26  % Final     % Monocytes 06/22/2023 10  % Final     % Eosinophils 06/22/2023 2  % Final     % Basophils 06/22/2023 1  % Final     % Immature Granulocytes 06/22/2023 0  % Final     NRBCs per 100 WBC 06/22/2023 0  <1 /100 Final     Absolute Neutrophils 06/22/2023 2.9  1.6 - 8.3 10e3/uL Final     Absolute Lymphocytes 06/22/2023 1.2  0.8 - 5.3 10e3/uL Final     Absolute Monocytes 06/22/2023 0.5  0.0 - 1.3 10e3/uL Final     Absolute Eosinophils  06/22/2023 0.1  0.0 - 0.7 10e3/uL Final     Absolute Basophils 06/22/2023 0.0  0.0 - 0.2 10e3/uL Final     Absolute Immature Granulocytes 06/22/2023 0.0  <=0.4 10e3/uL Final     Absolute NRBCs 06/22/2023 0.0  10e3/uL Final     I personally reviewed the above labs.      Imaging Studies:  US Pelvic Complete with Transvaginal  Narrative: EXAMINATION: US PELVIC TRANSABDOMINAL AND TRANSVAGINAL, 7/5/2023 12:25  PM     COMPARISON: CT chest abdomen and pelvis 6/22/2023    HISTORY: Choroidal malignant melanoma, left (H)    TECHNIQUE: The pelvis was scanned in standard fashion with  transabdominal and transvaginal transducer(s) using both grey scale  and limited color Doppler techniques.    FINDINGS:  The uterus measures 4.1 x 4.1 x 2.4 cm, and there is anterior  intramural fibroid measuring 1.6 cm.  The endometrium is within normal  limits and measures 2 mm. There is no free fluid in the pelvis.    The right ovary measures 2.1 x 1 x 2.1 cm and the left ovary measures  3.4 x 3.1 x 2.7 cm. There is a left ovarian cyst measuring 2.9 cm  without solid components or septations. There is normal blood flow to  the ovaries.  Impression: IMPRESSION:   1.  Uterine fibroid.  2.  2.9 cm left ovarian cyst without solid components or septations.  No follow up required per SRU guidelines.    Reference:  Simple Adnexal Cysts: SRU Consensus Conference Update on Follow-up and  Reporting  Pamela Weber et al.  [email or web address]    RJ CARRANZA MD         SYSTEM ID:  [id number]    EXAM: CT CHEST/ABDOMEN/PELVIS W CONTRAST  LOCATION: [facility]  DATE: 6/22/2023     INDICATION:  Malignant melanoma of uvea, unspecified laterality (H)  COMPARISON: None.  TECHNIQUE: CT scan of the chest, abdomen, and pelvis was performed following injection of IV contrast. Multiplanar reformats were obtained. Dose reduction techniques were used.   CONTRAST: Isovue 370 122cc     FINDINGS:   LUNGS  AND PLEURA: Mild linear atelectasis left lower lobe. Lungs otherwise clear. No pleural effusion.     MEDIASTINUM/AXILLAE: Normal.     CORONARY ARTERY CALCIFICATION: Mild.     HEPATOBILIARY: Normal.     PANCREAS: Normal.     SPLEEN: Normal.     ADRENAL GLANDS: Left adrenal nodule of indeterminate density measures 2.0 x 1.4 cm. Normal right adrenal gland.     KIDNEYS/BLADDER: 1 cm mass of right upper pole kidney has indeterminate density (series 5 image 127). Subcentimeter lesion left upper pole kidney posteriorly also indeterminate (series 5 image 104). No hydronephrosis. No bladder wall thickening.     BOWEL: Normal.     LYMPH NODES: Normal.     VASCULATURE: Unremarkable.     PELVIC ORGANS: 3.5 cm left adnexal cyst.     MUSCULOSKELETAL: Multilevel degenerative changes mid thoracic and lower lumbar spine.                                                                      IMPRESSION:  1.  Indeterminate 2 cm left adrenal nodule.  2.  Indeterminate bilateral renal masses measuring up to 1 cm.  3.  Recommend MRI abdomen for characterization of above findings.  4.  No other evidence to suggest metastatic disease in the chest, abdomen or pelvis.  5.  3.5 cm left adnexal cyst. Recommend pelvic ultrasound.        REFERENCE:  Management of Incidental Adnexal Findings on CT and MRI: A White Paper of the ACR Incidental Findings Committee. J Am Mitchell Radiol 2020; 17(2):248-254.     Postmenopausal or equal to or >50 years if status unknown:  >3 cm on CT: Ultrasound.    I personally reviewed the above imaging in PACS.

## 2023-07-07 ENCOUNTER — TELEPHONE (OUTPATIENT)
Dept: OPHTHALMOLOGY | Facility: CLINIC | Age: 58
End: 2023-07-07

## 2023-07-07 ENCOUNTER — PRE VISIT (OUTPATIENT)
Dept: SURGERY | Facility: CLINIC | Age: 58
End: 2023-07-07

## 2023-07-07 ENCOUNTER — OFFICE VISIT (OUTPATIENT)
Dept: SURGERY | Facility: CLINIC | Age: 58
End: 2023-07-07
Payer: COMMERCIAL

## 2023-07-07 ENCOUNTER — ANESTHESIA EVENT (OUTPATIENT)
Dept: SURGERY | Facility: CLINIC | Age: 58
End: 2023-07-07
Payer: COMMERCIAL

## 2023-07-07 VITALS
OXYGEN SATURATION: 98 % | RESPIRATION RATE: 16 BRPM | HEIGHT: 65 IN | HEART RATE: 57 BPM | DIASTOLIC BLOOD PRESSURE: 88 MMHG | BODY MASS INDEX: 35.25 KG/M2 | TEMPERATURE: 98.5 F | SYSTOLIC BLOOD PRESSURE: 154 MMHG | WEIGHT: 211.6 LBS

## 2023-07-07 DIAGNOSIS — Z01.818 PRE-OP EVALUATION: Primary | ICD-10-CM

## 2023-07-07 PROCEDURE — 99203 OFFICE O/P NEW LOW 30 MIN: CPT | Performed by: PHYSICIAN ASSISTANT

## 2023-07-07 RX ORDER — OXYBUTYNIN CHLORIDE 5 MG/1
5 TABLET ORAL 2 TIMES DAILY
COMMUNITY
Start: 2023-06-13 | End: 2023-09-20

## 2023-07-07 RX ORDER — MULTIPLE VITAMINS W/ MINERALS TAB 9MG-400MCG
1 TAB ORAL EVERY MORNING
COMMUNITY

## 2023-07-07 RX ORDER — VILAZODONE HYDROCHLORIDE 40 MG/1
40 TABLET ORAL EVERY MORNING
COMMUNITY
Start: 2023-06-14

## 2023-07-07 ASSESSMENT — ENCOUNTER SYMPTOMS: SEIZURES: 0

## 2023-07-07 ASSESSMENT — PAIN SCALES - GENERAL: PAINLEVEL: NO PAIN (0)

## 2023-07-07 ASSESSMENT — LIFESTYLE VARIABLES: TOBACCO_USE: 1

## 2023-07-07 NOTE — H&P
Pre-Operative H & P     CC:  Preoperative exam to assess for increased cardiopulmonary risk while undergoing surgery and anesthesia.    Date of Encounter: 7/7/2023  Primary Care Physician:  Julissa Batista     Reason for visit:   Encounter Diagnosis   Name Primary?     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 She was seen by Dr. Verde. She is now scheduled for the above procedure in preparation for gamma knife radiation.     History is obtained from the patient and chart review    Hx of abnormal bleeding or anti-platelet use: denies    Menstrual history: No LMP recorded (lmp unknown). Patient is postmenopausal.     Past Medical History  Past Medical History:   Diagnosis Date     ASCUS favor benign 04/01/2015    neg HPV. cotest in 3 years     Choroidal malignant melanoma, left (H)      Hypertension        Past Surgical History  Past Surgical History:   Procedure Laterality Date     TOOTH EXTRACTION         Prior to Admission Medications  Current Outpatient Medications   Medication Sig Dispense Refill     amLODIPine (NORVASC) 5 MG tablet Take 1 tablet (5 mg) by  mouth daily (Patient taking differently: Take 5 mg by mouth every morning) 90 tablet 3     multivitamin w/minerals (MULTI-VITAMIN) tablet Take 1 tablet by mouth every morning       oxybutynin (DITROPAN) 5 MG tablet Take 5 mg by mouth 2 times daily       vilazodone (VIIBRYD) 40 MG TABS tablet Take 40 mg by mouth every morning         Allergies  Allergies   Allergen Reactions     Ace Inhibitors Cough     And also gastroenterology distress - tried two diffent     Bee Venom      Codeine      Penicillins        Social History  Social History     Socioeconomic History     Marital status:      Spouse name: Not on file     Number of children: Not on file     Years of education: Not on file     Highest education level: Not on file   Occupational History     Not on file   Tobacco Use     Smoking status: Former     Packs/day: 0.50     Types: Cigarettes     Quit date: 10/2020     Years since quittin.7     Smokeless tobacco: Never   Substance and Sexual Activity     Alcohol use: Not Currently     Comment: quit drinking 2020     Drug use: Yes     Types: Marijuana     Comment: Daily use     Sexual activity: Yes     Partners: Male   Other Topics Concern     Parent/sibling w/ CABG, MI or angioplasty before 65F 55M? Not Asked   Social History Narrative     Not on file     Social Determinants of Health     Financial Resource Strain: Not on file   Food Insecurity: Not on file   Transportation Needs: Not on file   Physical Activity: Not on file   Stress: Not on file   Social Connections: Not on file   Intimate Partner Violence: Not on file   Housing Stability: Not on file       Family History  Family History   Problem Relation Age of Onset     Heart Disease Mother      Alzheimer Disease Mother      Heart Disease Father      Prostate Cancer Father      Anesthesia Reaction No family hx of      Deep Vein Thrombosis (DVT) No family hx of        Review of Systems  The complete review of systems is negative other than noted in  "the HPI or here.   Anesthesia Evaluation   Pt has had prior anesthetic.     No history of anesthetic complications       ROS/MED HX  ENT/Pulmonary:     (+) CAROL risk factors, snores loudly, hypertension, obese, tobacco use, Past use,     Neurologic:    (-) no seizures and no CVA   Cardiovascular:     (+) hypertension----- (-) taking anticoagulants/antiplatelets   METS/Exercise Tolerance: >4 METS Comment: Going to gym daily 30 minutes treadmill    Hematologic:  - neg hematologic  ROS  (-) history of blood clots and history of blood transfusion   Musculoskeletal:  - neg musculoskeletal ROS     GI/Hepatic:  - neg GI/hepatic ROS  (-) GERD   Renal/Genitourinary:  - neg Renal ROS     Endo:  - neg endo ROS  (-) chronic steroid usage   Psychiatric/Substance Use:     (+) psychiatric history depression     Infectious Disease:  - neg infectious disease ROS     Malignancy:   (+) Malignancy, History of Other.Other CA melanoma status post.    Other:            BP (!) 154/88 (BP Location: Right arm, Patient Position: Sitting, Cuff Size: Adult Regular)   Pulse 57   Temp 98.5  F (36.9  C) (Oral)   Resp 16   Ht 1.651 m (5' 5\")   Wt 96 kg (211 lb 9.6 oz)   LMP  (LMP Unknown)   SpO2 98%   Breastfeeding No   BMI 35.21 kg/m      Physical Exam   Constitutional: Awake, alert, cooperative, no apparent distress, and appears stated age.  Eyes: Pupils equal, round and reactive to light, extra ocular muscles intact, sclera clear, conjunctiva normal.  HENT: Normocephalic, oral pharynx with moist mucus membranes, fair dentition  Respiratory: Clear to auscultation bilaterally, no crackles or wheezing.  Cardiovascular: Regular rate and rhythm, normal S1 and S2, and no murmur noted.  Carotids no bruits. No edema. Palpable pulses to radial arteries.   GI: Normal bowel sounds, soft, non-distended, non-tender  Genitourinary:  deferred  Skin: Warm and dry.  No rashes at anticipated surgical site.   Musculoskeletal: Full ROM of neck. There is no " redness, warmth, or swelling of the exposed joints. Gross motor strength is normal.    Neurologic: Awake, alert, oriented to name, place and time. Cranial nerves II-XII are grossly intact.  Neuropsychiatric: Calm, cooperative. Normal affect.     Prior Labs/Diagnostic Studies   All labs and imaging personally reviewed     EKG/ stress test - if available please see in ROS above   No results found.       No data to display                  The patient's records and results personally reviewed by this provider.     Outside records reviewed from: Care Everywhere    LAB/DIAGNOSTIC STUDIES TODAY:  none    Assessment      Porsha Ledezma is a 58 year old female seen as a PAC referral for risk assessment and optimization for anesthesia.    Plan/Recommendations  Pt will be optimized for the proposed procedure.  See below for details on the assessment, risk, and preoperative recommendations    NEUROLOGY  - No history of TIA, CVA or seizure  -Post Op delirium risk factors:  No risk identified    ENT  - No current airway concerns.  Will need to be reassessed day of surgery.  Mallampati: I  TM: > 3    CARDIAC  - No history of CAD and Afib   -hypertension, will continue norvasc  -Denies cardiac history or symptoms  - METS (Metabolic Equivalents)  Patient performs 4 or more METS exercise without symptoms            Total Score: 0      RCRI-Very low risk: Class 1 0.4% complication rate            Total Score: 0        PULMONARY  CAROL Medium Risk            Total Score: 4    CAROL: Snores loudly    CAROL: Hypertension    CAROL: BMI over 35 kg/m2    CAROL: Over 50 ys old      - Denies asthma or inhaler use  - Tobacco History      History   Smoking Status     Former     Packs/day: 0.50     Types: Cigarettes     Quit date: 10/2020   Smokeless Tobacco     Never       GI  PONV High Risk  Total Score: 4           1 AN PONV: Pt is Female    1 AN PONV: Patient is not a current smoker    1 AN PONV: Patient has history of PONV    1 AN PONV: Intended  "Post Op Opioids        /RENAL  - Baseline Creatinine  WNL    ENDOCRINE    - BMI: Estimated body mass index is 35.21 kg/m  as calculated from the following:    Height as of this encounter: 1.651 m (5' 5\").    Weight as of this encounter: 96 kg (211 lb 9.6 oz).  Obesity (BMI >30)  - No history of Diabetes Mellitus    HEME  VTE Medium Risk 1.8%            Total Score: 6    VTE: Greater than 59 yrs old    VTE: Current cancer      - No history of abnormal bleeding or antiplatelet use.    ONC  -choroidal malignant melanoma with the above procedure planned     Different anesthesia methods/types have been discussed with the patient, but they are aware that the final plan will be decided by the assigned anesthesia provider on the date of service.    The patient is optimized for their procedure. AVS with information on surgery time/arrival time, meds and NPO status given by nursing staff. No further diagnostic testing indicated.      On the day of service:     Prep time: 10 minutes  Visit time: 9 minutes  Documentation time: 5 minutes  ------------------------------------------  Total time: 24 minutes      Delicia Simeon PA-C  Preoperative Assessment Center  [facility]  Clinic and Surgery Center  Phone: [phone]  Fax: [phone]  "

## 2023-07-07 NOTE — PATIENT INSTRUCTIONS
Preparing for Your Surgery      Name:  Porsha Ledezma   MRN:  [id number]   :  1965   Today's Date:  2023       Arriving for surgery:  Surgery date: 23  Arrival time:  5.30AM    Please come to:     Please come to:      [facility] [address]  [address] [place]  [address]      [facility] Patient /Visitor Ramp is located at [address]. Patients and visitors who self-park will receive the reduced hospital parking rate. If the Patient /Visitor Ramp is full, please follow the signs to the  parking located at the main hospital entrance.     parking is available ( 24 hours/ 7 days a week)    Discounted parking pass options are available for patients and visitors. They can be purchased at the [employer] desk at the main hospital entrance.    -    Stop at the security desk and they will direct surgery patients to the 3rd floor Surgery Waiting Room. [phone]     -  If you are in need of directions, wheelchair or escort please stop at the Information/security desk in the lobby.       What can I eat or drink?  -  You may eat and drink normally up to 8 hours prior to arrival time. (Until 9.30PM)  -  You may have clear liquids until 2 hours prior to arrival time. (Until 3.30AM)    Examples of clear liquids:  Water  Clear broth  Juices (apple, white grape, white cranberry  and cider) without pulp  Noncarbonated, powder based beverages  (lemonade and Mikey-Aid)  Sodas (Sprite, 7-Up, ginger ale and seltzer)  Coffee or tea (without milk or cream)  Gatorade    -  No Alcohol or cannabis products for at least 24 hours before surgery.     Which medicines can I take?    Hold Aspirin for 7 days before surgery.   Hold Multivitamins for 7 days before surgery.  Hold Supplements for 7 days before surgery.  Hold Ibuprofen (Advil, Motrin) for 1 day(s) before surgery--unless otherwise directed by surgeon.  Hold Naproxen  (Aleve) for 4 days before surgery.    -  DO NOT take these medications the day of surgery:  Oxybuytnin (Ditropan).     -  PLEASE TAKE these medications the day of surgery:  Amlodipine (Norvasc), Vilazodone (Viibyrd).    How do I prepare myself?  - Please take 2 showers (one the night prior to surgery and one the morning of surgery) using Scrubcare or Hibiclens soap.    Use this soap only from the neck to your toes.     Leave the soap on your skin for one minute--then rinse thoroughly.      You may use your own shampoo and conditioner. No other hair products.   - Please remove all jewelry and body piercings.  - No lotions, deodorants or fragrance.  - No makeup or fingernail polish.   - Bring your ID and insurance card.    -If you have a Deep Brain Stimulator, Spinal Cord Stimulator, or any Neuro Stimulator device---you must bring the remote control to the hospital.      ALL PATIENTS GOING HOME THE SAME DAY OF SURGERY ARE REQUIRED TO HAVE A RESPONSIBLE ADULT TO DRIVE AND BE IN ATTENDANCE WITH THEM FOR 24 HOURS FOLLOWING SURGERY.    Covid testing policy as of 12/06/2022  Your surgeon will notify and schedule you for a COVID test if one is needed before surgery--please direct any questions or COVID symptoms to your surgeon      Questions or Concerns:    - For any questions regarding the day of surgery or your hospital stay, please contact the Pre Admission Nursing Office at [phone].       - If you have health changes between today and your surgery, please call your surgeon.       - For questions after surgery, please call your surgeons office.           Current Visitor Guidelines    You may have 2 visitors in the pre op area.    Visiting hours: 8 a.m. to 8:30 p.m.    You may have four visitors during your inpatient hospital stay.    Patients confirmed or suspected to have symptoms of COVID 19 or flu:     No visitors allowed for adult patients.   Children (under age 18) can have 1 named visitor.     People who are  sick or showing symptoms of COVID 19 or flu:    Are not allowed to visit patients--we can only make exceptions in special situations.       Please follow these guidelines for your visit:          Please maintain social distance          Masking is optional--however at times you may be asked to wear a mask for the safety of yourself and others     Clean your hands with alcohol hand . Do this when you arrive at and leave the building and patient room,    And again after you touch your mask or anything in the room.     Go directly to and from the room you are visiting.     Stay in the patient s room during your visit. Limit going to other places in the hospital as much as possible     Leave bags and jackets at home or in the car.     For everyone s health, please don t come and go during your visit. That includes for smoking   during your visit.

## 2023-07-07 NOTE — PROGRESS NOTES
Department of Radiation Oncology  [facility]  [address] [place] [address]  [phone]       Consultation Note    Name: Porsha Ledezma MRN: [id number]   : 1965   Date of Service: Jul 10, 2023  Referring: Dr. Schmitt     Reason for consultation: Malignant choroidal melanoma of left eye    History of Present Illness   Ms. 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    Oncologic History:    May 2021: Patient undergoes ophthalmic exam that showed a nevus in the left eye    May 2023: Patient returns for an ophthalmologic exam and the left eye nevus was to be substantially larger    2023, ocular coherence tomography (OCT):    OD: Normal macula.  Posterior hyaloid face (PHF) attached    OS: Mild subretinal fluid SN macula.  PHF attached.  Elevated lesion with SRF at the periphery.  Abnormal juxtafoveal choroid.    2023, ocular ultrasound:    A-scan: Medium reflective    B-scan: Elevated lesion with no extension.  (+) Choroidal excavation.  0.7 x 11.58 x 10.33 mm.    2023, initial consultation with Dr. Corey (ophthalmology): Left eye choroidal malignant melanoma is suspected based on imaging.  A substantial size increase between  and  was noted on reviewed images.  These images were on the patient's phone, will be uploaded to axis.  Size noted to be 4 mm, orange in color, subretinal fluid present.  No abnormalities were noted on OCT that were juxtapapillary and very close to the optic nerve.  Dr. Cruz favors GK SRS instead of a plaque due to the juxtapapillary location.  Dr. Corey would consider tumor biopsy prior to GK SRS.    2023, CT CAP: Indeterminate 2 cm left adrenal nodule.  Indeterminate bilateral renal masses that measure up to 1 cm.  No evidence of other metastases.   0.5 cm left adnexal cyst noted.    7/5/2023, pelvic ultrasound: 2.9 cm left ovarian cyst without solid components or septation.  Follow-up required.    7/6/2023, follow-up with Dr. Schmitt: The patient reported no visual symptoms.  Patient reported that she values quality of life over quantity of life.  The patient would like to proceed with GK SRS and is declining separate biopsy of the lesion.  Regarding the bilateral renal masses, plan is to obtain an abdominal MRI on 1/14/2023 and follow-up with the results.    Chemotherapy History: None  Radiation History: None  Pregnant: No  Implanted Cardiac Devices: None   Autoimmune History: None    On interview, the patient reported that she has a scotoma in her inferotemporal field of vision out of her left eye.  The patient also confirms that she has an abdominal MRI in addition to to neurosurgical consult on 7/14/2023.  The patient has a planned GK SRS therapy session on 7/17/2023, which will be done while under anesthesia.  The patient specifically asked if all staff in the GK SRS suite wear a mask while she is unmasked during her GK SRS radiotherapy.  After asking the patient why, the patient responded that she has never gotten COVID-19 and would like to avoid rodolfo the illness at all cost.  She also reported that she has had the COVID-vaccine with 5 booster shots so far.    Past Medical History:  Past Medical History:   Diagnosis Date     ASCUS favor benign 04/01/2015    neg HPV. cotest in 3 years     Choroidal malignant melanoma, left (H)      Depression      Hypertension      Past Surgical History:  Past Surgical History:   Procedure Laterality Date     TOOTH EXTRACTION       Medications:  Current Outpatient Medications   Medication     amLODIPine (NORVASC) 5 MG tablet     oxybutynin (DITROPAN) 5 MG tablet     vilazodone (VIIBRYD) 40 MG TABS tablet     multivitamin w/minerals (MULTI-VITAMIN) tablet     No current facility-administered medications for this  "visit.     Allergies:  Allergies   Allergen Reactions     Ace Inhibitors Cough     And also gastroenterology distress - tried two diffent     Bee Venom      Codeine      Penicillins      Social History:  Social History     Socioeconomic History     Marital status:      Spouse name: Not on file     Number of children: 0     Years of education: Not on file     Highest education level: Not on file   Occupational History     Occupation: pull tab    Tobacco Use     Smoking status: Former     Packs/day: 1.00     Years: 30.00     Pack years: 30.00     Types: Cigarettes     Quit date: 10/2020     Years since quittin.7     Smokeless tobacco: Never   Substance and Sexual Activity     Alcohol use: Not Currently     Comment: quit drinking 2020     Drug use: Yes     Types: Marijuana     Comment: Daily use     Sexual activity: Yes     Partners: Male   Other Topics Concern     Parent/sibling w/ CABG, MI or angioplasty before 65F 55M? Not Asked   Social History Narrative     Not on file     Social Determinants of Health     Financial Resource Strain: Not on file   Food Insecurity: Not on file   Transportation Needs: Not on file   Physical Activity: Not on file   Stress: Not on file   Social Connections: Not on file   Intimate Partner Violence: Not on file   Housing Stability: Not on file     Family History:  Family History   Problem Relation Age of Onset     Heart Disease Mother      Alzheimer Disease Mother      Heart Disease Father      Prostate Cancer Father      Breast Cancer Maternal Aunt      Anesthesia Reaction No family hx of      Deep Vein Thrombosis (DVT) No family hx of      Review of Systems   A 10-point review of systems was performed. Pertinent findings are noted in the HPI.    Physical Exam   ECOG Status: 1    Vitals:  BP (!) 138/90   Pulse 71   Resp 16   Ht 1.651 m (5' 5\")   Wt 95.3 kg (210 lb)   LMP  (LMP Unknown)   SpO2 97%   BMI 34.95 kg/m      Gen: Alert, in NAD  Eyes: EOMI, sclera " anicteric  Ears: No external auricular lesions  Nose/sinus: No rhinorrhea or epistaxis  Oral cavity/oropharynx: MMM, no visible oral cavity lesions  Neck: Full ROM, supple  Pulm: No wheezing, stridor or respiratory distress  CV: Extremities are warm and well-perfused, no cyanosis, no pedal edema  Musculoskeletal: Normal bulk and tone  Skin: Normal color and turgor  Neuro: A/Ox3, CN II-XII intact, normal gait    Imaging/Path/Labs   Imaging: As above  Path: As above  Labs: As above    Assessment    Ms. 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  Plan   We recommend GK SRS in this instance because the choroidal melanoma is located so close to the optic nerve and there is concern about the potential for underdosing the lesion with plaque brachytherapy.  Plus the nasal location makes a COMS plaque challenging.      For this reason, we recommend treating this choroidal melanoma with Gammaknife and anticipate a dose of  22Grey to the 50% isodose line via GK SRS.  (Ashok et al, International Journal of Retina and Vitreous, 2017).    I explained the typical therapeutic options for these types of cancers include enucleation, plaque brachytherapy, and GK SRS.  I also explained the key findings of the COMS eye plaque study which found that overall survival rates between plaque brachytherapy and enucleation were equivalent.  I explained that because of the location of the tumor, there is a concern about under treating the lesion with plaque brachytherapy.  I explained that with radiation treatment the patient will need to return indefinitely for serial visits to ensure that the tumor does not resume growing. I explained that with the radiation therapy there could be damage to the lens, the optic nerve and to the macula that could in the long run cause severe vision loss. I  explained that this vision loss typically manifested within one to two years after treatment, but could manifest much sooner      Patient expressed understanding of all risks and benefits of GK SRS to the left globe.  All questions were answered.  Informed consent was obtained.    Patient was seen and discussed with my attending physician, Dr. Delgado.    John Walters MD, MS PGY-2  PGY-2 Radiation Oncology  Department of Radiation Oncology  [facility]  Phone: [phone]      I was personally present with the resident during the history and exam.  I   discussed the case with the resident and agree with the findings and plan   of care as documented in the resident's note.    Kia Delgado   [phone]     CC  Patient Care Team:  Nico Mishra as PCP - Madonna Haynes MD as Assigned Surgical Provider

## 2023-07-07 NOTE — TELEPHONE ENCOUNTER
spoke to patient today approx 5:25 PM using cell phone number, patient does not plan to have biopsy performed on [facility].

## 2023-07-09 ENCOUNTER — HEALTH MAINTENANCE LETTER (OUTPATIENT)
Age: 58
End: 2023-07-09

## 2023-07-10 ENCOUNTER — OFFICE VISIT (OUTPATIENT)
Dept: RADIATION ONCOLOGY | Facility: CLINIC | Age: 58
End: 2023-07-10
Attending: RADIOLOGY
Payer: COMMERCIAL

## 2023-07-10 ENCOUNTER — PRE VISIT (OUTPATIENT)
Dept: RADIATION ONCOLOGY | Facility: CLINIC | Age: 58
End: 2023-07-10

## 2023-07-10 VITALS
OXYGEN SATURATION: 97 % | BODY MASS INDEX: 34.99 KG/M2 | WEIGHT: 210 LBS | SYSTOLIC BLOOD PRESSURE: 138 MMHG | DIASTOLIC BLOOD PRESSURE: 90 MMHG | HEART RATE: 71 BPM | HEIGHT: 65 IN | RESPIRATION RATE: 16 BRPM

## 2023-07-10 DIAGNOSIS — C69.32 MALIGNANT MELANOMA OF CHOROID OF LEFT EYE (H): ICD-10-CM

## 2023-07-10 PROCEDURE — G0463 HOSPITAL OUTPT CLINIC VISIT: HCPCS | Performed by: RADIOLOGY

## 2023-07-10 PROCEDURE — 99204 OFFICE O/P NEW MOD 45 MIN: CPT | Performed by: RADIOLOGY

## 2023-07-10 ASSESSMENT — ENCOUNTER SYMPTOMS
CONSTIPATION: 0
DOUBLE VISION: 0
DIARRHEA: 0
WEIGHT LOSS: 0
FEVER: 0
RESPIRATORY NEGATIVE: 1
PHOTOPHOBIA: 0
BLOOD IN STOOL: 0
VOMITING: 0
NAUSEA: 0
MUSCULOSKELETAL NEGATIVE: 1
SEIZURES: 0
HEADACHES: 0
DEPRESSION: 1
BLURRED VISION: 0

## 2023-07-10 NOTE — LETTER
7/10/2023         RE: Porsha Ledezma  [address]  [facility] [place] [address]        Dear Colleague,    Thank you for referring your patient, Porsha Ledezma, to the [facility] RADIATION ONCOLOGY GAMMA KNIFE. Please see a copy of my visit note below.       Department of Radiation Oncology  [facility]  [address] [place] [address]  [phone]       Consultation Note    Name: Porsha Ledezma MRN: [id number]   : 1965   Date of Service: Jul 10, 2023  Referring: Dr. Schmitt     Reason for consultation: Malignant choroidal melanoma of left eye    History of Present Illness   Ms. 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    Oncologic History:    May 2021: Patient undergoes ophthalmic exam that showed a nevus in the left eye    May 2023: Patient returns for an ophthalmologic exam and the left eye nevus was to be substantially larger    2023, ocular coherence tomography (OCT):  OD: Normal macula.  Posterior hyaloid face (PHF) attached  OS: Mild subretinal fluid SN macula.  PHF attached.  Elevated lesion with SRF at the periphery.  Abnormal juxtafoveal choroid.    2023, ocular ultrasound:  A-scan: Medium reflective  B-scan: Elevated lesion with no extension.  (+) Choroidal excavation.  0.7 x 11.58 x 10.33 mm.    2023, initial consultation with Dr. Corey (ophthalmology): Left eye choroidal malignant melanoma is suspected based on imaging.  A substantial size increase between  and  was noted on reviewed images.  These images were on the patient's phone, will be uploaded to axis.  Size noted to be 4 mm, orange in color, subretinal fluid present.  No abnormalities were noted on OCT that were juxtapapillary and very close to the optic nerve.  Dr. Cruz favors GK SRS instead of a plaque  due to the juxtapapillary location.  Dr. Corey would consider tumor biopsy prior to GK SRS.    6/22/2023, CT CAP: Indeterminate 2 cm left adrenal nodule.  Indeterminate bilateral renal masses that measure up to 1 cm.  No evidence of other metastases.  0.5 cm left adnexal cyst noted.    7/5/2023, pelvic ultrasound: 2.9 cm left ovarian cyst without solid components or septation.  Follow-up required.    7/6/2023, follow-up with Dr. Schmitt: The patient reported no visual symptoms.  Patient reported that she values quality of life over quantity of life.  The patient would like to proceed with GK SRS and is declining separate biopsy of the lesion.  Regarding the bilateral renal masses, plan is to obtain an abdominal MRI on 1/14/2023 and follow-up with the results.    Chemotherapy History: None  Radiation History: None  Pregnant: No  Implanted Cardiac Devices: None   Autoimmune History: None    On interview, the patient reported that she has a scotoma in her inferotemporal field of vision out of her left eye.  The patient also confirms that she has an abdominal MRI in addition to to neurosurgical consult on 7/14/2023.  The patient has a planned GK SRS therapy session on 7/17/2023, which will be done while under anesthesia.  The patient specifically asked if all staff in the GK SRS suite wear a mask while she is unmasked during her GK SRS radiotherapy.  After asking the patient why, the patient responded that she has never gotten COVID-19 and would like to avoid rodolfo the illness at all cost.  She also reported that she has had the COVID-vaccine with 5 booster shots so far.    Past Medical History:  Past Medical History:   Diagnosis Date    ASCUS favor benign 04/01/2015    neg HPV. cotest in 3 years    Choroidal malignant melanoma, left (H)     Depression     Hypertension      Past Surgical History:  Past Surgical History:   Procedure Laterality Date    TOOTH EXTRACTION       Medications:  Current Outpatient  Medications   Medication    amLODIPine (NORVASC) 5 MG tablet    oxybutynin (DITROPAN) 5 MG tablet    vilazodone (VIIBRYD) 40 MG TABS tablet    multivitamin w/minerals (MULTI-VITAMIN) tablet     No current facility-administered medications for this visit.     Allergies:  Allergies   Allergen Reactions    Ace Inhibitors Cough     And also gastroenterology distress - tried two diffent    Bee Venom     Codeine     Penicillins      Social History:  Social History     Socioeconomic History    Marital status:      Spouse name: Not on file    Number of children: 0    Years of education: Not on file    Highest education level: Not on file   Occupational History    Occupation: pull tab    Tobacco Use    Smoking status: Former     Packs/day: 1.00     Years: 30.00     Pack years: 30.00     Types: Cigarettes     Quit date: 10/2020     Years since quittin.7    Smokeless tobacco: Never   Substance and Sexual Activity    Alcohol use: Not Currently     Comment: quit drinking 2020    Drug use: Yes     Types: Marijuana     Comment: Daily use    Sexual activity: Yes     Partners: Male   Other Topics Concern    Parent/sibling w/ CABG, MI or angioplasty before 65F 55M? Not Asked   Social History Narrative    Not on file     Social Determinants of Health     Financial Resource Strain: Not on file   Food Insecurity: Not on file   Transportation Needs: Not on file   Physical Activity: Not on file   Stress: Not on file   Social Connections: Not on file   Intimate Partner Violence: Not on file   Housing Stability: Not on file     Family History:  Family History   Problem Relation Age of Onset    Heart Disease Mother     Alzheimer Disease Mother     Heart Disease Father     Prostate Cancer Father     Breast Cancer Maternal Aunt     Anesthesia Reaction No family hx of     Deep Vein Thrombosis (DVT) No family hx of      Review of Systems   A 10-point review of systems was performed. Pertinent findings are noted in the  "HPI.    Physical Exam   ECOG Status: 1    Vitals:  BP (!) 138/90   Pulse 71   Resp 16   Ht 1.651 m (5' 5\")   Wt 95.3 kg (210 lb)   LMP  (LMP Unknown)   SpO2 97%   BMI 34.95 kg/m      Gen: Alert, in NAD  Eyes: EOMI, sclera anicteric  Ears: No external auricular lesions  Nose/sinus: No rhinorrhea or epistaxis  Oral cavity/oropharynx: MMM, no visible oral cavity lesions  Neck: Full ROM, supple  Pulm: No wheezing, stridor or respiratory distress  CV: Extremities are warm and well-perfused, no cyanosis, no pedal edema  Musculoskeletal: Normal bulk and tone  Skin: Normal color and turgor  Neuro: A/Ox3, CN II-XII intact, normal gait    Imaging/Path/Labs   Imaging: As above  Path: As above  Labs: As above    Assessment    Ms. 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  Plan   We recommend GK SRS in this instance because the choroidal melanoma is located so close to the optic nerve and there is concern about the potential for underdosing the lesion with plaque brachytherapy.  Plus the nasal location makes a COMS plaque challenging.      For this reason, we recommend treating this choroidal melanoma with Gammaknife and anticipate a dose of  22Grey to the 50% isodose line via GK SRS.  (Ashok et al, International Journal of Retina and Vitreous, 2017).    I explained the typical therapeutic options for these types of cancers include enucleation, plaque brachytherapy, and GK SRS.  I also explained the key findings of the [facility] eye plaque study which found that overall survival rates between plaque brachytherapy and enucleation were equivalent.  I explained that because of the location of the tumor, there is a concern about under treating the lesion with plaque brachytherapy.  I explained that with radiation treatment the patient will need to return indefinitely for serial " "visits to ensure that the tumor does not resume growing. I explained that with the radiation therapy there could be damage to the lens, the optic nerve and to the macula that could in the long run cause severe vision loss. I explained that this vision loss typically manifested within one to two years after treatment, but could manifest much sooner      Patient expressed understanding of all risks and benefits of GK SRS to the left globe.  All questions were answered.  Informed consent was obtained.    Patient was seen and discussed with my attending physician, Dr. Delgado.    John Walters MD, MS PGY-2  PGY-2 Radiation Oncology  Department of Radiation Oncology  [facility]  Phone: [phone]      I was personally present with the resident during the history and exam.  I   discussed the case with the resident and agree with the findings and plan   of care as documented in the resident's note.    Kia SEGURA Sandy   [phone]     CC  Patient Care Team:  Nico Mishra as PCP - Madonna Haynes MD as Assigned Surgical Provider        HPI    Date: 7/3/2023   Age: 58 year old  Ethnicity:    Sex: female  : 1965   Lives In: [place], [place]      Diagnosis: Left Choroidal Melanoma    Prior radiation therapy:   none    Prior chemotherapy:   none    RN time with patient: 55 minutes in person  Educated on Gamma Knife; yes    Doctors: Dr. Jose Schmitt, Dr. Madonna Corey, Dr. Kike Reynoso, no family doctor    Pain at time of consult: pt denies pain at time of consult  Is patient pregnant: Age 58  Does pt have a living will: pt does not have  Does pt have implanted cardiac device: pt has no implanted cardiac device    Has pt fallen in past week: pt has not fallen  Does pt feel steady on her feet:  Pt feels steady      Review Since Diagnosis:  Pt recalls being told had \"shadow\" in left eye   May 2021; pt had eye exam, this showed a nevus left eye   May 2023; " return eye exam, nevus left eye increased in size   6/12/23; pt saw Dr. Corey in consult,  Eye exam ultrasound showed on B Scan elevated lesion no extension, + choroidal excavation Size: 4.17mm x 11.58 mmT x 10.33 mmL.  Discussed possible biopsy, pt unsure, visit with Dr. Schmitt about before decide.  Presented options, Dr. Corey favoring Gamma Knife due to juxtapapillary location   6/22/23; CT CAP, Indeterminate 2 cm left adrenal nodule, indeterminate bilateral renal masses measuring up to 1 cm (MRI Abd recommended).  No other evidence mets in CAP  7/5/23; Pelvic Transabdominal and Transvaginal Ultrasound, showed a uterine fibroid and left ovarian cyst   7/6/23; pt video visit with Dr. Schmitt, Pt stating not really wanting a separate biopsy separate from the Gamma Knife treatment.  Bilateral renal masses, Dr. Schmitt wants MRI Abdomen.  No visual symptoms.  Plan to see every 6 months with scans  7/14/23; MRI Abdomen  7/14/23; to consult with Dr. Reynoso,     Chief Complaint: pt states doctors have agreed no biopsy, blind spot peripheral vision left eye, pt denies pain in left eye and no other visual changes except the blind spot.  Pt denies headaches  Review of Systems   Constitutional: Positive for malaise/fatigue. Negative for fever and weight loss.   HENT: Negative for hearing loss.    Eyes: Negative for blurred vision, double vision and photophobia.   Respiratory: Negative.    Cardiovascular: Negative for chest pain and leg swelling.   Gastrointestinal: Negative for blood in stool, constipation, diarrhea, nausea and vomiting.   Genitourinary: Negative.    Musculoskeletal: Negative.    Neurological: Negative for seizures and headaches.   Psychiatric/Behavioral: Positive for depression.                   Again, thank you for allowing me to participate in the care of your patient.        Sincerely,        Kia Delgado MD

## 2023-07-10 NOTE — PROGRESS NOTES
"  HPI    Date: 7/3/2023   Age: 58 year old  Ethnicity:    Sex: female  : 1965   Lives In: [place], [place]      Diagnosis: Left Choroidal Melanoma    Prior radiation therapy:   none    Prior chemotherapy:   none    RN time with patient: 55 minutes in person  Educated on Gamma Knife; yes    Doctors: Dr. Jose Schmitt, Dr. Madonna Corey, Dr. Kike Reynoso, no family doctor    Pain at time of consult: pt denies pain at time of consult  Is patient pregnant: Age 58  Does pt have a living will: pt does not have  Does pt have implanted cardiac device: pt has no implanted cardiac device    Has pt fallen in past week: pt has not fallen  Does pt feel steady on her feet:  Pt feels steady      Review Since Diagnosis:    Pt recalls being told had \"shadow\" in left eye     May 2021; pt had eye exam, this showed a nevus left eye     May 2023; return eye exam, nevus left eye increased in size     23; pt saw Dr. Corey in consult,  Eye exam ultrasound showed on B Scan elevated lesion no extension, + choroidal excavation Size: 4.17mm x 11.58 mmT x 10.33 mmL.  Discussed possible biopsy, pt unsure, visit with Dr. Schmitt about before decide.  Presented options, Dr. Corey favoring Gamma Knife due to juxtapapillary location     23; CT CAP, Indeterminate 2 cm left adrenal nodule, indeterminate bilateral renal masses measuring up to 1 cm (MRI Abd recommended).  No other evidence mets in CAP    23; Pelvic Transabdominal and Transvaginal Ultrasound, showed a uterine fibroid and left ovarian cyst     23; pt video visit with Dr. Schmitt, Pt stating not really wanting a separate biopsy separate from the Gamma Knife treatment.  Bilateral renal masses, Dr. Schmitt wants MRI Abdomen.  No visual symptoms.  Plan to see every 6 months with scans    23; MRI Abdomen    23; to consult with Dr. Reynoso,     Chief Complaint: pt states doctors have agreed no biopsy, blind spot peripheral vision left eye, " pt denies pain in left eye and no other visual changes except the blind spot.  Pt denies headaches  Review of Systems   Constitutional: Positive for malaise/fatigue. Negative for fever and weight loss.   HENT: Negative for hearing loss.    Eyes: Negative for blurred vision, double vision and photophobia.   Respiratory: Negative.    Cardiovascular: Negative for chest pain and leg swelling.   Gastrointestinal: Negative for blood in stool, constipation, diarrhea, nausea and vomiting.   Genitourinary: Negative.    Musculoskeletal: Negative.    Neurological: Negative for seizures and headaches.   Psychiatric/Behavioral: Positive for depression.

## 2023-07-14 ENCOUNTER — HOSPITAL ENCOUNTER (OUTPATIENT)
Dept: MRI IMAGING | Facility: CLINIC | Age: 58
Discharge: HOME | End: 2023-07-14
Attending: INTERNAL MEDICINE
Payer: COMMERCIAL

## 2023-07-14 ENCOUNTER — VIRTUAL VISIT (OUTPATIENT)
Dept: NEUROSURGERY | Facility: CLINIC | Age: 58
End: 2023-07-14
Attending: NEUROLOGICAL SURGERY
Payer: COMMERCIAL

## 2023-07-14 ENCOUNTER — PRE VISIT (OUTPATIENT)
Dept: NEUROSURGERY | Facility: CLINIC | Age: 58
End: 2023-07-14

## 2023-07-14 VITALS — HEIGHT: 65 IN | WEIGHT: 210 LBS | BODY MASS INDEX: 34.99 KG/M2

## 2023-07-14 DIAGNOSIS — C69.32 CHOROIDAL MALIGNANT MELANOMA, LEFT (H): ICD-10-CM

## 2023-07-14 DIAGNOSIS — C69.32 MALIGNANT MELANOMA OF CHOROID OF LEFT EYE (H): Primary | ICD-10-CM

## 2023-07-14 PROCEDURE — 255N000002 HC RX 255 OP 636: Performed by: INTERNAL MEDICINE

## 2023-07-14 PROCEDURE — A9581 GADOXETATE DISODIUM INJ: HCPCS | Performed by: INTERNAL MEDICINE

## 2023-07-14 PROCEDURE — 74183 MRI ABD W/O CNTR FLWD CNTR: CPT | Mod: 26 | Performed by: STUDENT IN AN ORGANIZED HEALTH CARE EDUCATION/TRAINING PROGRAM

## 2023-07-14 PROCEDURE — 74183 MRI ABD W/O CNTR FLWD CNTR: CPT

## 2023-07-14 PROCEDURE — 99203 OFFICE O/P NEW LOW 30 MIN: CPT | Mod: 95 | Performed by: NEUROLOGICAL SURGERY

## 2023-07-14 RX ADMIN — GADOXETATE DISODIUM 9.5 ML: 181.43 INJECTION, SOLUTION INTRAVENOUS at 10:03

## 2023-07-14 ASSESSMENT — PAIN SCALES - GENERAL: PAINLEVEL: NO PAIN (0)

## 2023-07-14 ASSESSMENT — LIFESTYLE VARIABLES: TOBACCO_USE: 1

## 2023-07-14 NOTE — LETTER
7/14/2023         RE: Porsha Ledezma  [address]  [facility] [address]        Dear Colleague,    Thank you for referring your patient, Porsha Ledezma, to the [facility]. Please see a copy of my visit note below.    Virtual Visit Details    Type of service:  Video Visit   Video Start Time: 11:15 AM  Video End Time:11:45 AM    Originating Location (pt. Location): Home  Distant Location (provider location):  On-site  Platform used for Video Visit: [facility]     Neurosurgery Clinic Note    58 F with left choroidal melanoma opting to undergo SRS. The patient presents today to review the risks/benefits of stereotactic frame placement as a part of this procedure. The lesion presented as an enlarging nevus and was diagnosed by Dr. Corey.     On review of systems, the patient denied new neurologic symptoms or history of previous neurosurgical procedures.    Non-focal motor examination on video    AP: 58 F who opted to undergo SRS as treatment of left choroidal melanoma. Rationale, risks, and benefits of the procedure in terms of the head frame placement were reviewed with the patient, including infection, bleeding, reaction to local anesthetics, fracture, and frame displacement were reviewed with the patient. All questions were answered. The procedure is scheduled for the upcoming Monday.      Kike Reynoso MD

## 2023-07-14 NOTE — NURSING NOTE
Is the patient currently in the state of [place]? YES    Visit mode:VIDEO    If the visit is dropped, the patient can be reconnected by: VIDEO VISIT: Text to cell phone: [phone]    Will anyone else be joining the visit? NO      How would you like to obtain your AVS? MyChart    Are changes needed to the allergy or medication list? NO    Reason for visit: Consult      MARIAH Clement

## 2023-07-14 NOTE — ANESTHESIA PREPROCEDURE EVALUATION
Anesthesia Pre-Procedure Evaluation    Patient: Porsha Ledezma   MRN: [id number] : 1965        Procedure : Procedure(s):  Left Eye Immobilization @0730  Placement, Head Frame  Anesthesia IN THE OR Magnetic Resonance Imaging Brain @0830  ANESTHESIA OUT OF OR planning from 9-1030, treatment from [id number] in Radiation Oncology, back to PACU @1300          Past Medical History:   Diagnosis Date     ASCUS favor benign 2015    neg HPV. cotest in 3 years     Choroidal malignant melanoma, left (H)      Depression      Hypertension       Past Surgical History:   Procedure Laterality Date     TOOTH EXTRACTION        Allergies   Allergen Reactions     Ace Inhibitors Cough     And also gastroenterology distress - tried two diffent     Bee Venom      Codeine      Penicillins       Social History     Tobacco Use     Smoking status: Former     Packs/day: 1.00     Years: 30.00     Pack years: 30.00     Types: Cigarettes     Quit date: 10/2020     Years since quittin.7     Smokeless tobacco: Never   Substance Use Topics     Alcohol use: Not Currently     Comment: quit drinking 2020      Wt Readings from Last 1 Encounters:   23 95.3 kg (210 lb)        Anesthesia Evaluation            ROS/MED HX  ENT/Pulmonary:     (+) tobacco use, Past use,     Neurologic:       Cardiovascular:     (+) hypertension-----    METS/Exercise Tolerance:     Hematologic:       Musculoskeletal:       GI/Hepatic:       Renal/Genitourinary:     (+) renal disease (justin renal masses),     Endo:     (+) Obesity,     Psychiatric/Substance Use:     (+) psychiatric history depression     Infectious Disease:       Malignancy:   (+) Malignancy (choroidal malignant melanoma left eye stage IIa), History of Other.    Other:            Physical Exam    Airway        Mallampati: I       Respiratory Devices and Support         Dental       (+) Minor Abnormalities - some fillings, tiny chips      Cardiovascular   cardiovascular exam normal           Pulmonary   pulmonary exam normal                OUTSIDE LABS:  CBC:   Lab Results   Component Value Date    WBC 4.7 06/22/2023    HGB 11.8 06/22/2023    HCT 36.2 06/22/2023     06/22/2023     BMP:   Lab Results   Component Value Date     06/22/2023     04/17/2014    POTASSIUM 3.6 06/22/2023    POTASSIUM 4.5 06/19/2014    CHLORIDE 105 06/22/2023    CHLORIDE 102 04/17/2014    CO2 28 06/22/2023    CO2 29 04/17/2014    BUN 13.8 06/22/2023    BUN 8 04/17/2014    CR 0.76 06/22/2023    CR 0.90 04/14/2016     (H) 06/22/2023    GLC 74 04/17/2014     COAGS: No results found for: PTT, INR, FIBR  POC: No results found for: BGM, HCG, HCGS  HEPATIC:   Lab Results   Component Value Date    ALBUMIN 3.6 06/22/2023    PROTTOTAL 5.8 (L) 06/22/2023    ALT 11 06/22/2023    AST 17 06/22/2023    ALKPHOS 60 06/22/2023    BILITOTAL 0.6 06/22/2023     OTHER:   Lab Results   Component Value Date    JAIME 9.0 06/22/2023       Anesthesia Plan    ASA Status:  3      Anesthesia Type: General.     - Airway: ETT   Induction: Intravenous, Propofol.   Maintenance: Balanced.        Consents    Anesthesia Plan(s) and associated risks, benefits, and realistic alternatives discussed. Questions answered and patient/representative(s) expressed understanding.     - Discussed: Risks, Benefits and Alternatives for the PROCEDURE were discussed     - Discussed with:  Patient      - Extended Intubation/Ventilatory Support Discussed: No.      - Patient is DNR/DNI Status: No    Use of blood products discussed: No .     Postoperative Care    Pain management: IV analgesics.   PONV prophylaxis: Ondansetron (or other 5HT-3), Dexamethasone or Solumedrol     Comments:           H&P reviewed: Unable to attach H&P to encounter due to EHR limitations. H&P Update: appropriate H&P reviewed, patient examined. No interval changes since H&P (within 30 days).         ALEXANDRA PERRY MD

## 2023-07-14 NOTE — PROGRESS NOTES
Virtual Visit Details    Type of service:  Video Visit   Video Start Time: 11:15 AM  Video End Time:11:45 AM    Originating Location (pt. Location): Home  Distant Location (provider location):  On-site  Platform used for Video Visit: [facility]     Neurosurgery Clinic Note    58 F with left choroidal melanoma opting to undergo SRS. The patient presents today to review the risks/benefits of stereotactic frame placement as a part of this procedure. The lesion presented as an enlarging nevus and was diagnosed by Dr. Corey.     On review of systems, the patient denied new neurologic symptoms or history of previous neurosurgical procedures.    Non-focal motor examination on video    AP: 58 F who opted to undergo SRS as treatment of left choroidal melanoma. Rationale, risks, and benefits of the procedure in terms of the head frame placement were reviewed with the patient, including infection, bleeding, reaction to local anesthetics, fracture, and frame displacement were reviewed with the patient. All questions were answered. The procedure is scheduled for the upcoming Monday.

## 2023-07-17 ENCOUNTER — OFFICE VISIT (OUTPATIENT)
Dept: RADIATION ONCOLOGY | Facility: CLINIC | Age: 58
End: 2023-07-17
Attending: NEUROLOGICAL SURGERY
Payer: COMMERCIAL

## 2023-07-17 ENCOUNTER — OFFICE VISIT (OUTPATIENT)
Dept: RADIATION ONCOLOGY | Facility: CLINIC | Age: 58
End: 2023-07-17
Attending: RADIOLOGY
Payer: COMMERCIAL

## 2023-07-17 ENCOUNTER — TELEPHONE (OUTPATIENT)
Dept: OPHTHALMOLOGY | Facility: CLINIC | Age: 58
End: 2023-07-17

## 2023-07-17 ENCOUNTER — HOSPITAL ENCOUNTER (OUTPATIENT)
Dept: MRI IMAGING | Facility: CLINIC | Age: 58
Discharge: HOME | End: 2023-07-17
Attending: RADIOLOGY | Admitting: OPHTHALMOLOGY
Payer: COMMERCIAL

## 2023-07-17 ENCOUNTER — HOSPITAL ENCOUNTER (OUTPATIENT)
Facility: CLINIC | Age: 58
Discharge: HOME | End: 2023-07-17
Attending: OPHTHALMOLOGY | Admitting: OPHTHALMOLOGY
Payer: COMMERCIAL

## 2023-07-17 ENCOUNTER — ANESTHESIA (OUTPATIENT)
Dept: SURGERY | Facility: CLINIC | Age: 58
End: 2023-07-17
Payer: COMMERCIAL

## 2023-07-17 VITALS
TEMPERATURE: 99 F | DIASTOLIC BLOOD PRESSURE: 91 MMHG | HEART RATE: 75 BPM | RESPIRATION RATE: 12 BRPM | BODY MASS INDEX: 35.34 KG/M2 | OXYGEN SATURATION: 96 % | SYSTOLIC BLOOD PRESSURE: 150 MMHG | HEIGHT: 65 IN | WEIGHT: 212.08 LBS

## 2023-07-17 DIAGNOSIS — C69.32 CHOROIDAL MALIGNANT MELANOMA, LEFT (H): Primary | ICD-10-CM

## 2023-07-17 DIAGNOSIS — C69.32 CHOROID MELANOMA OF LEFT EYE (H): ICD-10-CM

## 2023-07-17 DIAGNOSIS — C69.32 CHOROID MELANOMA OF LEFT EYE (H): Primary | ICD-10-CM

## 2023-07-17 PROCEDURE — 250N000013 HC RX MED GY IP 250 OP 250 PS 637: Performed by: STUDENT IN AN ORGANIZED HEALTH CARE EDUCATION/TRAINING PROGRAM

## 2023-07-17 PROCEDURE — 710N000009 HC RECOVERY PHASE 1, LEVEL 1, PER MIN: Performed by: OPHTHALMOLOGY

## 2023-07-17 PROCEDURE — 77432 STEREOTACTIC RADIATION TRMT: CPT | Performed by: RADIOLOGY

## 2023-07-17 PROCEDURE — 999N000141 HC STATISTIC PRE-PROCEDURE NURSING ASSESSMENT: Performed by: OPHTHALMOLOGY

## 2023-07-17 PROCEDURE — 77295 3-D RADIOTHERAPY PLAN: CPT | Performed by: RADIOLOGY

## 2023-07-17 PROCEDURE — 272N000001 HC OR GENERAL SUPPLY STERILE: Performed by: OPHTHALMOLOGY

## 2023-07-17 PROCEDURE — 250N000011 HC RX IP 250 OP 636: Performed by: NEUROLOGICAL SURGERY

## 2023-07-17 PROCEDURE — 360N000074 HC SURGERY LEVEL 1, PER MIN: Performed by: OPHTHALMOLOGY

## 2023-07-17 PROCEDURE — 77300 RADIATION THERAPY DOSE PLAN: CPT | Performed by: RADIOLOGY

## 2023-07-17 PROCEDURE — 77370 RADIATION PHYSICS CONSULT: CPT | Performed by: RADIOLOGY

## 2023-07-17 PROCEDURE — 250N000011 HC RX IP 250 OP 636: Mod: JZ | Performed by: REGISTERED NURSE

## 2023-07-17 PROCEDURE — 255N000002 HC RX 255 OP 636: Mod: JZ | Performed by: RADIOLOGY

## 2023-07-17 PROCEDURE — 250N000009 HC RX 250: Performed by: OPHTHALMOLOGY

## 2023-07-17 PROCEDURE — 67500 INJECT/TREAT EYE SOCKET: CPT | Mod: LT | Performed by: OPHTHALMOLOGY

## 2023-07-17 PROCEDURE — 710N000012 HC RECOVERY PHASE 2, PER MINUTE: Performed by: OPHTHALMOLOGY

## 2023-07-17 PROCEDURE — 70552 MRI BRAIN STEM W/DYE: CPT

## 2023-07-17 PROCEDURE — 250N000009 HC RX 250: Performed by: NEUROLOGICAL SURGERY

## 2023-07-17 PROCEDURE — 70552 MRI BRAIN STEM W/DYE: CPT | Mod: 26 | Performed by: RADIOLOGY

## 2023-07-17 PROCEDURE — A9585 GADOBUTROL INJECTION: HCPCS | Mod: JZ | Performed by: RADIOLOGY

## 2023-07-17 PROCEDURE — 77334 RADIATION TREATMENT AID(S): CPT | Performed by: RADIOLOGY

## 2023-07-17 PROCEDURE — 250N000011 HC RX IP 250 OP 636: Mod: JZ | Performed by: OPHTHALMOLOGY

## 2023-07-17 PROCEDURE — 61800 APPLY SRS HEADFRAME ADD-ON: CPT | Performed by: NEUROLOGICAL SURGERY

## 2023-07-17 PROCEDURE — 258N000003 HC RX IP 258 OP 636: Performed by: REGISTERED NURSE

## 2023-07-17 PROCEDURE — 250N000009 HC RX 250: Performed by: REGISTERED NURSE

## 2023-07-17 PROCEDURE — 250N000025 HC SEVOFLURANE, PER MIN: Performed by: OPHTHALMOLOGY

## 2023-07-17 PROCEDURE — 61798 SRS CRANIAL LESION COMPLEX: CPT | Performed by: NEUROLOGICAL SURGERY

## 2023-07-17 PROCEDURE — 370N000017 HC ANESTHESIA TECHNICAL FEE, PER MIN: Performed by: OPHTHALMOLOGY

## 2023-07-17 PROCEDURE — 77334 RADIATION TREATMENT AID(S): CPT | Mod: 26 | Performed by: RADIOLOGY

## 2023-07-17 PROCEDURE — 77300 RADIATION THERAPY DOSE PLAN: CPT | Mod: 26 | Performed by: RADIOLOGY

## 2023-07-17 PROCEDURE — 77295 3-D RADIOTHERAPY PLAN: CPT | Mod: 26 | Performed by: RADIOLOGY

## 2023-07-17 PROCEDURE — 250N000011 HC RX IP 250 OP 636: Performed by: ANESTHESIOLOGY

## 2023-07-17 PROCEDURE — 77371 SRS MULTISOURCE: CPT | Performed by: RADIOLOGY

## 2023-07-17 RX ORDER — OXYCODONE HYDROCHLORIDE 10 MG/1
10 TABLET ORAL
Status: DISCONTINUED | OUTPATIENT
Start: 2023-07-17 | End: 2023-07-17 | Stop reason: HOSPADM

## 2023-07-17 RX ORDER — GLYCOPYRROLATE 0.2 MG/ML
INJECTION, SOLUTION INTRAMUSCULAR; INTRAVENOUS PRN
Status: DISCONTINUED | OUTPATIENT
Start: 2023-07-17 | End: 2023-07-17

## 2023-07-17 RX ORDER — ONDANSETRON 2 MG/ML
4 INJECTION INTRAMUSCULAR; INTRAVENOUS EVERY 30 MIN PRN
Status: DISCONTINUED | OUTPATIENT
Start: 2023-07-17 | End: 2023-07-17 | Stop reason: HOSPADM

## 2023-07-17 RX ORDER — SODIUM CHLORIDE, SODIUM LACTATE, POTASSIUM CHLORIDE, CALCIUM CHLORIDE 600; 310; 30; 20 MG/100ML; MG/100ML; MG/100ML; MG/100ML
INJECTION, SOLUTION INTRAVENOUS CONTINUOUS PRN
Status: DISCONTINUED | OUTPATIENT
Start: 2023-07-17 | End: 2023-07-17

## 2023-07-17 RX ORDER — FENTANYL CITRATE 50 UG/ML
INJECTION, SOLUTION INTRAMUSCULAR; INTRAVENOUS PRN
Status: DISCONTINUED | OUTPATIENT
Start: 2023-07-17 | End: 2023-07-17

## 2023-07-17 RX ORDER — ONDANSETRON 4 MG/1
4 TABLET, ORALLY DISINTEGRATING ORAL EVERY 30 MIN PRN
Status: DISCONTINUED | OUTPATIENT
Start: 2023-07-17 | End: 2023-07-17 | Stop reason: HOSPADM

## 2023-07-17 RX ORDER — ERYTHROMYCIN 5 MG/G
0.5 OINTMENT OPHTHALMIC 3 TIMES DAILY
Qty: 3.5 G | Refills: 0 | Status: SHIPPED | OUTPATIENT
Start: 2023-07-17 | End: 2023-09-20

## 2023-07-17 RX ORDER — CYCLOPENTOLAT/TROPIC/PHENYLEPH 1%-1%-2.5%
1 DROPS (EA) OPHTHALMIC (EYE)
Status: DISCONTINUED | OUTPATIENT
Start: 2023-07-17 | End: 2023-07-17 | Stop reason: HOSPADM

## 2023-07-17 RX ORDER — ERYTHROMYCIN 5 MG/G
OINTMENT OPHTHALMIC PRN
Status: DISCONTINUED | OUTPATIENT
Start: 2023-07-17 | End: 2023-07-17 | Stop reason: HOSPADM

## 2023-07-17 RX ORDER — SODIUM CHLORIDE, SODIUM LACTATE, POTASSIUM CHLORIDE, CALCIUM CHLORIDE 600; 310; 30; 20 MG/100ML; MG/100ML; MG/100ML; MG/100ML
INJECTION, SOLUTION INTRAVENOUS CONTINUOUS
Status: DISCONTINUED | OUTPATIENT
Start: 2023-07-17 | End: 2023-07-17 | Stop reason: HOSPADM

## 2023-07-17 RX ORDER — FENTANYL CITRATE 50 UG/ML
50 INJECTION, SOLUTION INTRAMUSCULAR; INTRAVENOUS EVERY 5 MIN PRN
Status: DISCONTINUED | OUTPATIENT
Start: 2023-07-17 | End: 2023-07-17 | Stop reason: HOSPADM

## 2023-07-17 RX ORDER — GADOBUTROL 604.72 MG/ML
10 INJECTION INTRAVENOUS ONCE
Status: COMPLETED | OUTPATIENT
Start: 2023-07-17 | End: 2023-07-17

## 2023-07-17 RX ORDER — LIDOCAINE 40 MG/G
CREAM TOPICAL
Status: DISCONTINUED | OUTPATIENT
Start: 2023-07-17 | End: 2023-07-17 | Stop reason: HOSPADM

## 2023-07-17 RX ORDER — DEXAMETHASONE SODIUM PHOSPHATE 4 MG/ML
INJECTION, SOLUTION INTRA-ARTICULAR; INTRALESIONAL; INTRAMUSCULAR; INTRAVENOUS; SOFT TISSUE PRN
Status: DISCONTINUED | OUTPATIENT
Start: 2023-07-17 | End: 2023-07-17

## 2023-07-17 RX ORDER — EPHEDRINE SULFATE 50 MG/ML
INJECTION, SOLUTION INTRAMUSCULAR; INTRAVENOUS; SUBCUTANEOUS PRN
Status: DISCONTINUED | OUTPATIENT
Start: 2023-07-17 | End: 2023-07-17

## 2023-07-17 RX ORDER — HYDROMORPHONE HYDROCHLORIDE 1 MG/ML
0.2 INJECTION, SOLUTION INTRAMUSCULAR; INTRAVENOUS; SUBCUTANEOUS EVERY 5 MIN PRN
Status: DISCONTINUED | OUTPATIENT
Start: 2023-07-17 | End: 2023-07-17 | Stop reason: HOSPADM

## 2023-07-17 RX ORDER — LIDOCAINE HYDROCHLORIDE 20 MG/ML
INJECTION, SOLUTION INFILTRATION; PERINEURAL PRN
Status: DISCONTINUED | OUTPATIENT
Start: 2023-07-17 | End: 2023-07-17

## 2023-07-17 RX ORDER — ACETAMINOPHEN 325 MG/1
325-650 TABLET ORAL
Status: DISCONTINUED | OUTPATIENT
Start: 2023-07-17 | End: 2023-07-17 | Stop reason: HOSPADM

## 2023-07-17 RX ORDER — PROPOFOL 10 MG/ML
INJECTION, EMULSION INTRAVENOUS CONTINUOUS PRN
Status: DISCONTINUED | OUTPATIENT
Start: 2023-07-17 | End: 2023-07-17

## 2023-07-17 RX ORDER — PROPARACAINE HYDROCHLORIDE 5 MG/ML
1 SOLUTION/ DROPS OPHTHALMIC ONCE
Status: COMPLETED | OUTPATIENT
Start: 2023-07-17 | End: 2023-07-17

## 2023-07-17 RX ORDER — OXYCODONE HYDROCHLORIDE 5 MG/1
5 TABLET ORAL
Status: DISCONTINUED | OUTPATIENT
Start: 2023-07-17 | End: 2023-07-17 | Stop reason: HOSPADM

## 2023-07-17 RX ORDER — FENTANYL CITRATE 50 UG/ML
25 INJECTION, SOLUTION INTRAMUSCULAR; INTRAVENOUS EVERY 5 MIN PRN
Status: DISCONTINUED | OUTPATIENT
Start: 2023-07-17 | End: 2023-07-17 | Stop reason: HOSPADM

## 2023-07-17 RX ORDER — PROPOFOL 10 MG/ML
INJECTION, EMULSION INTRAVENOUS PRN
Status: DISCONTINUED | OUTPATIENT
Start: 2023-07-17 | End: 2023-07-17

## 2023-07-17 RX ORDER — HYDROMORPHONE HYDROCHLORIDE 1 MG/ML
0.4 INJECTION, SOLUTION INTRAMUSCULAR; INTRAVENOUS; SUBCUTANEOUS EVERY 5 MIN PRN
Status: DISCONTINUED | OUTPATIENT
Start: 2023-07-17 | End: 2023-07-17 | Stop reason: HOSPADM

## 2023-07-17 RX ADMIN — Medication 50 MG: at 12:05

## 2023-07-17 RX ADMIN — PHENYLEPHRINE HYDROCHLORIDE 100 MCG: 10 INJECTION INTRAVENOUS at 10:07

## 2023-07-17 RX ADMIN — ACETAMINOPHEN 650 MG: 325 TABLET, FILM COATED ORAL at 17:00

## 2023-07-17 RX ADMIN — Medication 50 MG: at 09:08

## 2023-07-17 RX ADMIN — PHENYLEPHRINE HYDROCHLORIDE 150 MCG: 10 INJECTION INTRAVENOUS at 10:13

## 2023-07-17 RX ADMIN — PHENYLEPHRINE HYDROCHLORIDE 0.3 MCG/KG/MIN: 10 INJECTION INTRAVENOUS at 11:47

## 2023-07-17 RX ADMIN — MIDAZOLAM 1 MG: 1 INJECTION INTRAMUSCULAR; INTRAVENOUS at 08:04

## 2023-07-17 RX ADMIN — PHENYLEPHRINE HYDROCHLORIDE 150 MCG: 10 INJECTION INTRAVENOUS at 08:14

## 2023-07-17 RX ADMIN — SODIUM CHLORIDE, POTASSIUM CHLORIDE, SODIUM LACTATE AND CALCIUM CHLORIDE: 600; 310; 30; 20 INJECTION, SOLUTION INTRAVENOUS at 10:39

## 2023-07-17 RX ADMIN — FENTANYL CITRATE 50 MCG: 50 INJECTION, SOLUTION INTRAMUSCULAR; INTRAVENOUS at 08:04

## 2023-07-17 RX ADMIN — Medication 50 MG: at 08:04

## 2023-07-17 RX ADMIN — PHENYLEPHRINE HYDROCHLORIDE 150 MCG: 10 INJECTION INTRAVENOUS at 11:22

## 2023-07-17 RX ADMIN — PROPOFOL 30 MG: 10 INJECTION, EMULSION INTRAVENOUS at 14:18

## 2023-07-17 RX ADMIN — SUGAMMADEX 200 MG: 100 INJECTION, SOLUTION INTRAVENOUS at 14:45

## 2023-07-17 RX ADMIN — Medication 20 MG: at 14:18

## 2023-07-17 RX ADMIN — SODIUM CHLORIDE, POTASSIUM CHLORIDE, SODIUM LACTATE AND CALCIUM CHLORIDE: 600; 310; 30; 20 INJECTION, SOLUTION INTRAVENOUS at 08:01

## 2023-07-17 RX ADMIN — GADOBUTROL 10 ML: 604.72 INJECTION INTRAVENOUS at 10:39

## 2023-07-17 RX ADMIN — PHENYLEPHRINE HYDROCHLORIDE 150 MCG: 10 INJECTION INTRAVENOUS at 08:24

## 2023-07-17 RX ADMIN — PHENYLEPHRINE HYDROCHLORIDE 100 MCG: 10 INJECTION INTRAVENOUS at 09:48

## 2023-07-17 RX ADMIN — Medication 30 MG: at 08:25

## 2023-07-17 RX ADMIN — PROPOFOL 50 MCG/KG/MIN: 10 INJECTION, EMULSION INTRAVENOUS at 10:14

## 2023-07-17 RX ADMIN — LIDOCAINE HYDROCHLORIDE 100 MG: 20 INJECTION, SOLUTION INFILTRATION; PERINEURAL at 08:04

## 2023-07-17 RX ADMIN — PHENYLEPHRINE HYDROCHLORIDE 100 MCG: 10 INJECTION INTRAVENOUS at 09:58

## 2023-07-17 RX ADMIN — FENTANYL CITRATE 50 MCG: 50 INJECTION, SOLUTION INTRAMUSCULAR; INTRAVENOUS at 14:18

## 2023-07-17 RX ADMIN — PHENYLEPHRINE HYDROCHLORIDE 150 MCG: 10 INJECTION INTRAVENOUS at 08:56

## 2023-07-17 RX ADMIN — PROPOFOL 200 MG: 10 INJECTION, EMULSION INTRAVENOUS at 08:04

## 2023-07-17 RX ADMIN — FENTANYL CITRATE 50 MCG: 50 INJECTION, SOLUTION INTRAMUSCULAR; INTRAVENOUS at 15:13

## 2023-07-17 RX ADMIN — GLYCOPYRROLATE 0.2 MG: 0.2 INJECTION, SOLUTION INTRAMUSCULAR; INTRAVENOUS at 09:02

## 2023-07-17 RX ADMIN — DEXAMETHASONE SODIUM PHOSPHATE 4 MG: 4 INJECTION, SOLUTION INTRA-ARTICULAR; INTRALESIONAL; INTRAMUSCULAR; INTRAVENOUS; SOFT TISSUE at 08:25

## 2023-07-17 RX ADMIN — FENTANYL CITRATE 50 MCG: 50 INJECTION, SOLUTION INTRAMUSCULAR; INTRAVENOUS at 15:18

## 2023-07-17 RX ADMIN — Medication 50 MG: at 12:35

## 2023-07-17 RX ADMIN — HYDROMORPHONE HYDROCHLORIDE 0.2 MG: 1 INJECTION, SOLUTION INTRAMUSCULAR; INTRAVENOUS; SUBCUTANEOUS at 15:31

## 2023-07-17 RX ADMIN — Medication 20 MG: at 09:59

## 2023-07-17 RX ADMIN — MIDAZOLAM 1 MG: 1 INJECTION INTRAMUSCULAR; INTRAVENOUS at 07:55

## 2023-07-17 ASSESSMENT — ACTIVITIES OF DAILY LIVING (ADL)
ADLS_ACUITY_SCORE: 35

## 2023-07-17 NOTE — NURSING NOTE
Porsha had her Gamma Knife treatment done today, 7/17/23,  for her left choroidal melanoma being done under general anesthesia.  Frame placed in the OR and all pins removed intact post Gamma Knife treatment.  Gauze with bacitracin ointment to each of the four pin sites and head wrapped with kerlix wrap.  Dr. Delgado removed sutures from left eye and pad placed over the eye.  Pt taken back to OR to be awakened post Gamma Knife.  Discharge instructions were reviewed with Samia, the friend of Porsha's who is her  today as Porsha gave me permission to review everything with her.  Anesthesia monitoring throughout.

## 2023-07-17 NOTE — ANESTHESIA POSTPROCEDURE EVALUATION
Patient: Porsha Ledezma    Procedure: Procedure(s):  Left Eye Immobilization @0730  Placement, Head Frame  Anesthesia OUT OF THE OR Magnetic Resonance Imaging Brain  ANESTHESIA OUT OF OR planning from 9-1030, treatment from [id number] in Radiation Oncology, back to PACU @1300       Anesthesia Type:  General    Note:  Disposition: Outpatient   Postop Pain Control: Uneventful            Sign Out: Well controlled pain   PONV:    Neuro/Psych: Uneventful            Sign Out: Acceptable/Baseline neuro status   Airway/Respiratory: Uneventful            Sign Out: Acceptable/Baseline resp. status   CV/Hemodynamics: Uneventful            Sign Out: Acceptable CV status; No obvious hypovolemia; No obvious fluid overload   Other NRE: NONE   DID A NON-ROUTINE EVENT OCCUR? No           Last vitals:  Vitals Value Taken Time   /69 07/17/23 1456   Temp     Pulse 87 07/17/23 1500   Resp 13 07/17/23 1500   SpO2 99 % 07/17/23 1500   Vitals shown include unvalidated device data.    Electronically Signed By: ALEXANDRA PERRY MD  July 17, 2023  3:02 PM

## 2023-07-17 NOTE — ANESTHESIA PROCEDURE NOTES
Airway       Patient location during procedure: OR       Procedure Start/Stop Times: 7/17/2023 8:07 AM  Staff -        Anesthesiologist:  Darin Myles MD       CRNA: Marisol Rojas APRN CRNA       Performed By: CRNA  Consent for Airway        Urgency: elective  Indications and Patient Condition       Indications for airway management: david-procedural       Induction type:intravenous       Mask difficulty assessment: 1 - vent by mask    Final Airway Details       Final airway type: endotracheal airway       Successful airway: ETT - single and Oral  Endotracheal Airway Details        ETT size (mm): 7.0       Cuffed: yes       Successful intubation technique: direct laryngoscopy       DL Blade Type: MAC 3       Grade View of Cords: 1       Adjucts: stylet       Position: Right       Measured from: lips       Secured at (cm): 22       Bite block used: None    Post intubation assessment        Placement verified by: capnometry, equal breath sounds and chest rise        Number of attempts at approach: 1       Number of other approaches attempted: 0       Secured with: pink tape and plastic tape       Ease of procedure: easy       Dentition: Intact and Unchanged    Medication(s) Administered   Medication Administration Time: 7/17/2023 8:07 AM

## 2023-07-17 NOTE — OP NOTE
PRE-OP Dx:                           1) Choroidal Melanoma left eye     Post-OP Dx:   Same     Attending:      LAURA Corey MD  Resident:        Abbie Richey MD     Anesthesia:    General  Procedure:                            1) medial and lateral Rectus eye fixation                          2) retrobulbar block                               EBL:                scant  Specimens:    None     Complications: none     Findings: None        Procedure Description:     Porsha Ledezma is an 58 year old year old patient with a concern for choroidal melanoma of the left eye.  After informed consent was obtained, the patient was brought into the OR where general anesthesia was administered.  The eye was then prepped  with betadine with a lid scrub in the usual fashion for ophthalmic surgery.  A sterile eyelid speculum was placed.       The lateral and medial rectus were isolated with a superior rectus forceps and a 0-gauge silk suture was passed behind through the conjunctiva in a bridle suture fashion, isolating each muscle.  A retrobulbar block was then placed.    Application of erythromycin was performed and the eye was covered with tegaderm.     Please refer to Neurosurgery Note for further details of frame placement. The muscle fixation sutures were tied to the neurosurgery frame.        The patient left the OR with no complications.        I was present for the entire surgery.  Madonna Corey MD

## 2023-07-17 NOTE — BRIEF OP NOTE
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]  Post-operative diagnosis Same as pre-operative diagnosis    Procedure: Procedure(s):  Left Eye Immobilization @0730  Placement, Head Frame  Anesthesia IN THE OR Magnetic Resonance Imaging Brain @0830  ANESTHESIA OUT OF OR planning from 9-1030, treatment from [id number] in Radiation Oncology, back to PACU @1300  Surgeon: Surgeon(s) and Role:  Panel 1:     * Madonna Corey MD - Primary     * Abbie Richey MD - Resident - Assisting  Panel 2:     * Kike Reynoso MD - Primary     * Matheus Hoffmann MD - Resident - Assisting  Panel 3:     * GENERIC ANESTHESIA PROVIDER - Primary     * Kia Delgado MD - Assisting  Anesthesia: General   Estimated Blood Loss: None    Drains: None  Specimens: * No specimens in log *  Findings:   None.  Complications: None.  Implants: * No implants in log *

## 2023-07-17 NOTE — ANESTHESIA CARE TRANSFER NOTE
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    Anesthesia Type:   General     Note:    Oropharynx: oropharynx clear of all foreign objects and spontaneously breathing  Level of Consciousness: awake  Oxygen Supplementation: nasal cannula  Level of Supplemental Oxygen (L/min / FiO2): 2  Independent Airway: airway patency satisfactory and stable  Dentition: dentition unchanged  Vital Signs Stable: post-procedure vital signs reviewed and stable  Report to RN Given: handoff report given  Patient transferred to: PACU    Handoff Report: Identifed the Patient, Identified the Reponsible Provider, Reviewed the pertinent medical history, Discussed the surgical course, Reviewed Intra-OP anesthesia mangement and issues during anesthesia, Set expectations for post-procedure period and Allowed opportunity for questions and acknowledgement of understanding      Vitals:  Vitals Value Taken Time   /71 07/17/23 1500   Temp     Pulse 85 07/17/23 1505   Resp 26 07/17/23 1505   SpO2 99 % 07/17/23 1505   Vitals shown include unvalidated device data.    Electronically Signed By: AHSAN Valdez CRNA  July 17, 2023  3:05 PM

## 2023-07-17 NOTE — OR NURSING
Patient's transport/home care friend called:  Samia Dominguez [phone] and verified transport home and stay with patient for 24 hours.

## 2023-07-17 NOTE — DISCHARGE INSTRUCTIONS
[facility]  Same-Day Surgery   Adult Discharge Orders & Instructions     For 24 hours after surgery    Get plenty of rest.  A responsible adult must stay with you for at least 24 hours after you leave the hospital.   Do not drive or use heavy equipment.  If you have weakness or tingling, don't drive or use heavy equipment until this feeling goes away.  Do not drink alcohol.  Avoid strenuous or risky activities.  Ask for help when climbing stairs.   You may feel lightheaded.  IF so, sit for a few minutes before standing.  Have someone help you get up.   If you have nausea (feel sick to your stomach): Drink only clear liquids such as apple juice, ginger ale, broth or 7-Up.  Rest may also help.  Be sure to drink enough fluids.  Move to a regular diet as you feel able.  You may have a slight fever. Call the doctor if your fever is over 100 F (37.7 C) (taken under the tongue) or lasts longer than 24 hours.  You may have a dry mouth, a sore throat, muscle aches or trouble sleeping.  These should go away after 24 hours.  Do not make important or legal decisions.   Call your doctor for any of the followin.  Signs of infection (fever, growing tenderness at the surgery site, a large amount of drainage or bleeding, severe pain, foul-smelling drainage, redness, swelling).    2. It has been over 8 to 10 hours since surgery and you are still not able to urinate (pass water).    3.  Headache for over 24 hours.      To contact a doctor, call _____Dr. Madonna Corey [facility] [[phone]]______ or:    '   [phone] and ask for the resident on call for   ____opthalmology______ (answered 24 hours a day)  '   Emergency Department:    [facility]: [phone]       (TTY for hearing impaired: [phone])

## 2023-07-17 NOTE — LETTER
2023         RE: Porsha Ledezma  [address]  [place] [address]        Dear Colleague,    Thank you for referring your patient, Porsha Ledezma, to the [facility] RADIATION ONCOLOGY GAMMA KNIFE. Please see a copy of my visit note below.    Gamma Knife Operative Note    MRN: [id number]  Name: Porsha Ledezma  : 1965    Date of procedure: 2023    Surgeon:  Kike Reynoso MD PhD    Pre-operative Diagnosis:   Left choroidal melanoma  Post-operative Diagnosis:   Same    Procedure: Gamma Knife Radiosurgery    Indication: This is a 58 y.o. F with a newly diagnosed left choroidal melanoma. After case review in a multi-disciplinary conference, the joint recommendation was to treat the patient with radiosurgery. Standard measurements were obtained for coordinate calculation to facilitate SRS delivery.    On the day of the procedure, informed consent was obtained. The previous MRI was reviewed. The Leksell stereotactic frame was attached to the patient's cranium using standard technique. With the frame placed, the patient underwent an MRI of the head with contrast. No new additional lesions were identified on this MRI. The immobilization of extra-ocular muscles were achieved by Dr. Corey and will be dictated in a separate note.    The treatment is planned on the Gamma plan system based on the MRI taken on the day of the procedure.  Treatment parameters were verified by myself, the treating radiation oncologist, and the physicist.     The lesion was treated with 22 Gy delivered in a single fraction. The lesion is in close proximity to the optic nerve, a highly radiation sensitive anatomic structure. The maximal diameter of the lesion was < 1.0 cm.   The dose was delivered in a highly conformal fashion. The treatment was performed at the [facility] gamma knife center.     I was present and performed the key portions of this procedure.    Kike Reynoso MD PhD

## 2023-07-18 ENCOUNTER — TELEPHONE (OUTPATIENT)
Dept: RADIATION ONCOLOGY | Facility: CLINIC | Age: 58
End: 2023-07-18

## 2023-07-18 ENCOUNTER — TELEPHONE (OUTPATIENT)
Dept: OPHTHALMOLOGY | Facility: CLINIC | Age: 58
End: 2023-07-18

## 2023-07-18 NOTE — OP NOTE
Gamma Knife Operative Note    MRN: [id number]  Name: Porsha Ledezma  : 1965    Date of procedure: 2023    Surgeon:  Kike Reynoso MD PhD    Pre-operative Diagnosis:   Left choroidal melanoma  Post-operative Diagnosis:   Same    Procedure: Gamma Knife Radiosurgery    Indication: This is a 58 y.o. F with a newly diagnosed left choroidal melanoma. After case review in a multi-disciplinary conference, the joint recommendation was to treat the patient with radiosurgery. Standard measurements were obtained for coordinate calculation to facilitate SRS delivery.    On the day of the procedure, informed consent was obtained. The previous MRI was reviewed. The Leksell stereotactic frame was attached to the patient's cranium using standard technique. With the frame placed, the patient underwent an MRI of the head with contrast. No new additional lesions were identified on this MRI. The immobilization of extra-ocular muscles were achieved by Dr. Corey and will be dictated in a separate note.    The treatment is planned on the Gamma plan system based on the MRI taken on the day of the procedure.  Treatment parameters were verified by myself, the treating radiation oncologist, and the physicist.     The lesion was treated with 22 Gy delivered in a single fraction. The lesion is in close proximity to the optic nerve, a highly radiation sensitive anatomic structure. The maximal diameter of the lesion was < 1.0 cm.   The dose was delivered in a highly conformal fashion. The treatment was performed at the [facility] gamma knife center.     I was present and performed the key portions of this procedure.    Kike Reynoso MD PhD

## 2023-07-18 NOTE — PROGRESS NOTES
Heart Failure/ Pulmonary Hypertension Progress Note - Gustabo Iglesias  42 y o  male MRN: [id number]    Unit/Bed#: [facility] 421-01 Encounter: [id number]      Assessment:    Principal Problem:    NSTEMI (non-ST elevated myocardial infarction) ([facility] [address] )  Active Problems:    Essential hypertension    Diabetes mellitus type 2, uncontrolled (HCC)    Snoring    Hyperlipidemia      Subjective:   Patient seen and examined  No significant events overnight  Objective:   CMR- no thrombus seen  Intake/ Output:  88/0/530  Weight: 275 lbs  Tele:     # NSTEMI type 1  pk trop 3 1  C 12/19/19 - mLAD 70%, mLCx 75%, OM2 90%, mRCA 50%, dRCA 90%,rPDA 90%  - Plavix and Ticagrelor on hold in anticipation of surgery    # New HFrEF, Stage C, NYHA  Etiology: presumably iCM mixed with NICM- HTN on admit    Echocardiogram 12/19/19  LVEF: 30%, no thrombus on my view- apex contracts  LVIDd: 5 7 cm  RV: normal  MR:    Neurohormonal Blockade:  --Beta-Blocker: metoprolol succinate 25 mg BID  --ACEi, ARB or ARNi: lisinopril 40 mg daily    (or SVR reduction)  --Aldosterone Receptor Blocker:  --Diuretic:    Sudden Cardiac Death Risk Reduction:  --ICD: eventual LifeVest    Electrical Resynchronization:  Narrow QRS    # HTN- poorly controlled on admit  Meds: amlodipine 10 mg, lisinopril 40 mg, lopressor 25 mg BID    # Dyslipidemia  , , HDL 27,   Started on atorvastatin 80 mg initiated  # DM2- HgA1C 10 1%  # Obesity  # probable JANKI  # hx of med non compliance    Plan:  Recommend CABG    Central Line (day, reason): Santamaria catheter (day, reason):    Vitals: Blood pressure 166/95, pulse 95, temperature 97 7 °F (36 5 °C), temperature source Oral, resp  rate 18, height 5' 9" (1 753 m), weight 125 kg (275 lb), SpO2 95 %  , Body mass index is 40 61 kg/m² , I/O last 3 completed shifts: In: 0   Out: 500 [Urine:500]  No intake/output data recorded    Wt Readings from Last 3 Encounters:   12/19/19 125 kg (275 lb)   12/18/19 125 kg (276 lb done   3 8 oz)   08/17/18 133 kg (294 lb)       Intake/Output Summary (Last 24 hours) at 12/20/2019 1439  Last data filed at 12/20/2019 0500  Gross per 24 hour   Intake 0 ml   Output 500 ml   Net -500 ml     I/O last 3 completed shifts: In: 0   Out: 500 [Urine:500]    No significant arrhythmias seen on telemetry review  Physical Exam:  Vitals:    12/20/19 0333 12/20/19 0730 12/20/19 1111 12/20/19 1200   BP: 142/90 139/80  166/95   BP Location: Left arm Left arm     Pulse: 97 91 95    Resp: 18 18 18    Temp: 98 3 °F (36 8 °C) 97 7 °F (36 5 °C)     TempSrc: Oral Oral     SpO2: 95% 96% 95%    Weight:       Height:           GEN: Vernon Mckeno   appears well, alert and oriented x 3, pleasant and cooperative   HEENT: pupils equal, round, and reactive to light; extraocular muscles intact  NECK: supple, no carotid bruits   HEART: regular rhythm, normal S1 and S2, no murmurs, clicks, gallops or rubs, JVP is    LUNGS: clear to auscultation bilaterally; no wheezes, rales, or rhonchi   ABDOMEN: normal bowel sounds, soft, no tenderness, no distention  EXTREMITIES: peripheral pulses normal; no clubbing, cyanosis, or edema  NEURO: no focal findings   SKIN: normal without suspicious lesions on exposed skin      Current Facility-Administered Medications:     ALPRAZolam (XANAX) tablet 0 25 mg, 0 25 mg, Oral, BID PRN, Oly Quiroz DO, 0 25 mg at 12/20/19 1415    amLODIPine (NORVASC) tablet 10 mg, 10 mg, Oral, Daily, Andria Ewing MD, 10 mg at 12/20/19 1580    aspirin chewable tablet 81 mg, 81 mg, Oral, Daily, Andria Ewing MD, 81 mg at 12/20/19 7825    atorvastatin (LIPITOR) tablet 80 mg, 80 mg, Oral, Daily With Dinner, Andria Ewing MD    insulin glargine (LANTUS) subcutaneous injection 30 Units 0 3 mL, 30 Units, Subcutaneous, HS, Andria Ewing MD, 30 Units at 12/19/19 9965    insulin lispro (HumaLOG) 100 units/mL subcutaneous injection 1-6 Units, 1-6 Units, Subcutaneous, 4x Daily (AC & HS), 5 Units at 12/20/19 6938 **AND** Fingerstick Glucose (POCT), , , 4x Daily AC and at bedtime, Alia Renee MD    insulin lispro (HumaLOG) 100 units/mL subcutaneous injection 10 Units, 10 Units, Subcutaneous, TID With Meals, Alia Renee MD, 10 Units at 12/20/19 1135    Labetalol HCl (NORMODYNE) injection 10 mg, 10 mg, Intravenous, Q4H PRN, Ever Pedro MD    lisinopril (ZESTRIL) tablet 40 mg, 40 mg, Oral, Daily, Alia Renee MD, 40 mg at 12/20/19 0837    metoprolol succinate (TOPROL-XL) 24 hr tablet 25 mg, 25 mg, Oral, BID, Alvarez Grant PA-C, 25 mg at 12/20/19 1412    ondansetron (ZOFRAN) injection 4 mg, 4 mg, Intravenous, Q6H PRN, Alia Renee MD      Labs & Results:    Results from last 7 days   Lab Units 12/19/19  0544 12/19/19  0216 12/18/19  2314   TROPONIN I ng/mL 2 83* 3 11* 2 57*     Results from last 7 days   Lab Units 12/18/19  1629   WBC Thousand/uL 10 84*   HEMOGLOBIN g/dL 16 3   HEMATOCRIT % 48 2   PLATELETS Thousands/uL 273         Results from last 7 days   Lab Units 12/18/19  1629   POTASSIUM mmol/L 4 0   CHLORIDE mmol/L 97*   CO2 mmol/L 29   BUN mg/dL 15   CREATININE mg/dL 1 18   CALCIUM mg/dL 9 5   ALK PHOS U/L 89   ALT U/L 30   AST U/L 19     Results from last 7 days   Lab Units 12/18/19  1629   INR  1 03       Counseling / Coordination of Care  Total floor / unit time spent today 20 minutes  Greater than 50% of total time was spent with the patient and / or family counseling and / or coordination of care  A description of the counseling / coordination of care: 10  Thank you for the opportunity to participate in the care of this patient  Timothy ROSARIO    Director Heart Failure/ Medical Director [address] [place] [address]

## 2023-07-18 NOTE — PROGRESS NOTES
Gamma Knife Operative Note    MRN: [id number]  Name: Porsha Ledezma  : 1965    Date of procedure: 2023    Surgeon:  Kike Reynoso MD PhD    Pre-operative Diagnosis:   Left choroidal melanoma  Post-operative Diagnosis:   Same    Procedure: Gamma Knife Radiosurgery    Indication: This is a 58 y.o. F with a newly diagnosed left choroidal melanoma. After case review in a multi-disciplinary conference, the joint recommendation was to treat the patient with radiosurgery. Standard measurements were obtained for coordinate calculation to facilitate SRS delivery.    On the day of the procedure, informed consent was obtained. The previous MRI was reviewed. The Leksell stereotactic frame was attached to the patient's cranium using standard technique. With the frame placed, the patient underwent an MRI of the head with contrast. No new additional lesions were identified on this MRI. The immobilization of extra-ocular muscles were achieved by Dr. Corey and will be dictated in a separate note.    The treatment is planned on the Gamma plan system based on the MRI taken on the day of the procedure.  Treatment parameters were verified by myself, the treating radiation oncologist, and the physicist.     The lesion was treated with 22 Gy delivered in a single fraction. The lesion is in close proximity to the optic nerve, a highly radiation sensitive anatomic structure. The maximal diameter of the lesion was < 1.0 cm.   The dose was delivered in a highly conformal fashion. The treatment was performed at the [facility] gamma knife center.     I was present and performed the key portions of this procedure.    Kike Reynoso MD PhD

## 2023-07-18 NOTE — TELEPHONE ENCOUNTER
A call was placed to Porsha in follow up to her Gamma Knife treatment on 7/17/23 for her left choroidal melanoma.  Porsha said she got home late yesterday afternoon, really felt quite good.  Porsha said she has the headwrap off and has showered already this morning.  Porsha denies a headache, says all of the pin sites look good but the right posterior site is tender.  Porsha said she is having some swelling around her left eye, it is aching but not painful and the vision is really quite good today, she can still see the peripheral cut but it is clearer than she expected.  Porsha said she decided not to go to work this afternoon, she is going to take it easy today but truly feels good.

## 2023-07-18 NOTE — TELEPHONE ENCOUNTER
Spoke with Porsha to schedule a 3 month post-op following Gamma Knife. Scheduled for 10/17/23 with Dr. Madonna Corey.

## 2023-07-21 NOTE — PROGRESS NOTES
Name: Porsha Ledezma  : 1965  Medical Record #:[id number]    Diagnosis:    Date of Treatment: 2023  Referring Physicians: Madonna Corey MD, Tumor Registry      GAMMA KNIFE PROCEDURE NOTE and TREATMENT SUMMARY    Treatment Summary:     Treatment Site Dose Modality Collimators Shots   Left Retina 22 Gy to 54% isodose Cobalt 60 4,8mm 15             DESCRIPTION OF PROCEDURE:  On 2023 the patient was induced under general anesthesia in the operating room.     The stereotactic head frame was placed by Dr Reynoso.    A retrobulbar block was performed by Dr. Corey. He also sutured the medial and lateral rectus muscles to the head frame in order to completely immobilize the eye. The eye was taped shut with Tegaderm.    She underwent a stealth MRI. These images were then sent to the [employer] Gamma Knife computer system where the [employer] Gamma Knife IconTM Plan software was used to create a highly conformal dose distribution using the number and size collimators detailed above.     She was brought to the Gamma Knife suite at the [facility].      The patient was then treated on the Icon Leksell Gamma Knife. Treatment involved 1 target.      TREATMENT:    The patient was brought to the Gamma Knife suite. The patient was identified by 2 methods. A timeout was performed to confirm the correct patient and correct procedure. The site was identified by an MRI image and treatment planning software, which defined the treatment area.     We evaluated the frame manually to ensure it was still secure.     The treatment was delivered using the Lesksell Gamma Knife IconTM without complication. The sutures were clipped. The head frame was removed.      The patient was taken back to the recovery room where she was reversed from anesthesia.    The patient recovered and was discharged home in stable condition.    The patient tolerated the treatment well and had no  complications.    FOLLOW-UP PLANS:  The Gamma Knife Nurse Coordinator will call the patient tomorrow for short-term follow up.  Written discharge instructions were given to the patient.     The patient will have a follow up imaging with Dr Corey.     Approved by:  Kia Delgado MD

## 2023-09-13 ASSESSMENT — ENCOUNTER SYMPTOMS
NERVOUS/ANXIOUS: 0
FEVER: 0
DYSURIA: 0
MYALGIAS: 0
HEMATURIA: 0
SORE THROAT: 0
ARTHRALGIAS: 0
DIARRHEA: 0
FREQUENCY: 1
COUGH: 0
CONSTIPATION: 0
EYE PAIN: 0
NAUSEA: 0
SHORTNESS OF BREATH: 0
WEAKNESS: 0
HEADACHES: 0
CHILLS: 0
HEMATOCHEZIA: 0
BREAST MASS: 0
DIZZINESS: 0
HEARTBURN: 0
PARESTHESIAS: 0
ABDOMINAL PAIN: 0
JOINT SWELLING: 0
PALPITATIONS: 0

## 2023-09-20 ENCOUNTER — OFFICE VISIT (OUTPATIENT)
Dept: FAMILY MEDICINE | Facility: CLINIC | Age: 58
End: 2023-09-20
Payer: COMMERCIAL

## 2023-09-20 ENCOUNTER — ANCILLARY PROCEDURE (OUTPATIENT)
Dept: GENERAL RADIOLOGY | Facility: CLINIC | Age: 58
End: 2023-09-20
Attending: INTERNAL MEDICINE
Payer: COMMERCIAL

## 2023-09-20 VITALS
DIASTOLIC BLOOD PRESSURE: 80 MMHG | RESPIRATION RATE: 20 BRPM | WEIGHT: 212.2 LBS | HEART RATE: 70 BPM | HEIGHT: 64 IN | BODY MASS INDEX: 36.23 KG/M2 | TEMPERATURE: 97.4 F | OXYGEN SATURATION: 100 % | SYSTOLIC BLOOD PRESSURE: 124 MMHG

## 2023-09-20 DIAGNOSIS — C69.32 MALIGNANT MELANOMA OF CHOROID OF LEFT EYE (H): ICD-10-CM

## 2023-09-20 DIAGNOSIS — Z12.31 VISIT FOR SCREENING MAMMOGRAM: ICD-10-CM

## 2023-09-20 DIAGNOSIS — F33.41 RECURRENT MAJOR DEPRESSIVE DISORDER, IN PARTIAL REMISSION (H): ICD-10-CM

## 2023-09-20 DIAGNOSIS — Z12.31 ENCOUNTER FOR SCREENING MAMMOGRAM FOR BREAST CANCER: ICD-10-CM

## 2023-09-20 DIAGNOSIS — I10 HTN, GOAL BELOW 140/90: ICD-10-CM

## 2023-09-20 DIAGNOSIS — E66.812 CLASS 2 SEVERE OBESITY DUE TO EXCESS CALORIES WITH SERIOUS COMORBIDITY AND BODY MASS INDEX (BMI) OF 36.0 TO 36.9 IN ADULT (H): ICD-10-CM

## 2023-09-20 DIAGNOSIS — T14.8XXA FOREIGN BODY IN SKIN: ICD-10-CM

## 2023-09-20 DIAGNOSIS — Z00.00 ROUTINE GENERAL MEDICAL EXAMINATION AT A HEALTH CARE FACILITY: Primary | ICD-10-CM

## 2023-09-20 DIAGNOSIS — Z11.59 NEED FOR HEPATITIS C SCREENING TEST: ICD-10-CM

## 2023-09-20 DIAGNOSIS — E27.8 ADRENAL INCIDENTALOMA (H): ICD-10-CM

## 2023-09-20 DIAGNOSIS — E66.01 CLASS 2 SEVERE OBESITY DUE TO EXCESS CALORIES WITH SERIOUS COMORBIDITY AND BODY MASS INDEX (BMI) OF 36.0 TO 36.9 IN ADULT (H): ICD-10-CM

## 2023-09-20 DIAGNOSIS — R39.15 URINARY URGENCY: ICD-10-CM

## 2023-09-20 DIAGNOSIS — Z12.11 SCREEN FOR COLON CANCER: ICD-10-CM

## 2023-09-20 DIAGNOSIS — Z11.4 SCREENING FOR HIV (HUMAN IMMUNODEFICIENCY VIRUS): ICD-10-CM

## 2023-09-20 LAB
CHOLEST SERPL-MCNC: 234 MG/DL
CORTIS SERPL-MCNC: 7.2 UG/DL
HBA1C MFR BLD: 5.6 % (ref 0–5.6)
HDLC SERPL-MCNC: 54 MG/DL
LDLC SERPL CALC-MCNC: 168 MG/DL
NONHDLC SERPL-MCNC: 180 MG/DL
TRIGL SERPL-MCNC: 62 MG/DL

## 2023-09-20 PROCEDURE — 82533 TOTAL CORTISOL: CPT | Performed by: INTERNAL MEDICINE

## 2023-09-20 PROCEDURE — 90471 IMMUNIZATION ADMIN: CPT | Performed by: INTERNAL MEDICINE

## 2023-09-20 PROCEDURE — 86803 HEPATITIS C AB TEST: CPT | Performed by: INTERNAL MEDICINE

## 2023-09-20 PROCEDURE — 99214 OFFICE O/P EST MOD 30 MIN: CPT | Mod: 25 | Performed by: INTERNAL MEDICINE

## 2023-09-20 PROCEDURE — 96127 BRIEF EMOTIONAL/BEHAV ASSMT: CPT | Performed by: INTERNAL MEDICINE

## 2023-09-20 PROCEDURE — 73130 X-RAY EXAM OF HAND: CPT | Mod: TC | Performed by: RADIOLOGY

## 2023-09-20 PROCEDURE — 80061 LIPID PANEL: CPT | Performed by: INTERNAL MEDICINE

## 2023-09-20 PROCEDURE — 87389 HIV-1 AG W/HIV-1&-2 AB AG IA: CPT | Performed by: INTERNAL MEDICINE

## 2023-09-20 PROCEDURE — 36415 COLL VENOUS BLD VENIPUNCTURE: CPT | Performed by: INTERNAL MEDICINE

## 2023-09-20 PROCEDURE — 90682 RIV4 VACC RECOMBINANT DNA IM: CPT | Performed by: INTERNAL MEDICINE

## 2023-09-20 PROCEDURE — 99386 PREV VISIT NEW AGE 40-64: CPT | Mod: 25 | Performed by: INTERNAL MEDICINE

## 2023-09-20 PROCEDURE — 83036 HEMOGLOBIN GLYCOSYLATED A1C: CPT | Performed by: INTERNAL MEDICINE

## 2023-09-20 PROCEDURE — 82627 DEHYDROEPIANDROSTERONE: CPT | Performed by: INTERNAL MEDICINE

## 2023-09-20 RX ORDER — AMLODIPINE BESYLATE 5 MG/1
5 TABLET ORAL EVERY MORNING
Qty: 90 TABLET | Refills: 3 | Status: SHIPPED | OUTPATIENT
Start: 2023-09-20 | End: 2024-09-23

## 2023-09-20 RX ORDER — OXYBUTYNIN CHLORIDE 5 MG/1
5 TABLET ORAL 2 TIMES DAILY
Qty: 180 TABLET | Refills: 3 | Status: SHIPPED | OUTPATIENT
Start: 2023-09-20 | End: 2024-09-23

## 2023-09-20 ASSESSMENT — ENCOUNTER SYMPTOMS
PARESTHESIAS: 0
HEMATURIA: 0
WEAKNESS: 0
DIARRHEA: 0
ABDOMINAL PAIN: 0
HEARTBURN: 0
DIZZINESS: 0
EYE PAIN: 0
PALPITATIONS: 0
CHILLS: 0
SORE THROAT: 0
NERVOUS/ANXIOUS: 0
DYSURIA: 0
FREQUENCY: 1
ARTHRALGIAS: 0
MYALGIAS: 0
NAUSEA: 0
HEADACHES: 0
FEVER: 0
HEMATOCHEZIA: 0
JOINT SWELLING: 0
CONSTIPATION: 0
SHORTNESS OF BREATH: 0
BREAST MASS: 0
COUGH: 0

## 2023-09-20 ASSESSMENT — PATIENT HEALTH QUESTIONNAIRE - PHQ9
SUM OF ALL RESPONSES TO PHQ QUESTIONS 1-9: 5
SUM OF ALL RESPONSES TO PHQ QUESTIONS 1-9: 5
10. IF YOU CHECKED OFF ANY PROBLEMS, HOW DIFFICULT HAVE THESE PROBLEMS MADE IT FOR YOU TO DO YOUR WORK, TAKE CARE OF THINGS AT HOME, OR GET ALONG WITH OTHER PEOPLE: NOT DIFFICULT AT ALL

## 2023-09-20 ASSESSMENT — PAIN SCALES - GENERAL: PAINLEVEL: NO PAIN (0)

## 2023-09-20 NOTE — PROGRESS NOTES
SUBJECTIVE:   CC: Porsha is an 58 year old who presents for preventive health visit.       2023     8:44 AM   Additional Questions   Roomed by Helga   Accompanied by alone         2023     8:44 AM   Patient Reported Additional Medications   Patient reports taking the following new medications none       Healthy Habits:     Getting at least 3 servings of Calcium per day:  Yes    Bi-annual eye exam:  Yes    Dental care twice a year:  Yes    Sleep apnea or symptoms of sleep apnea:  None    Diet:  Regular (no restrictions)    Frequency of exercise:  6-7 days/week    Duration of exercise:  30-45 minutes    Taking medications regularly:  Yes    Medication side effects:  None    Additional concerns today:  Yes      Today's PHQ-9 Score:       2023     8:25 AM   PHQ-9 SCORE   PHQ-9 Total Score MyChart 5 (Mild depression)   PHQ-9 Total Score 5       Has incontinence with severe urgency- oxbutynin helps.    Once and a while will have heart symptoms where it feels like could have some palpitations.    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  Walks for 30 min every morning- gets moderate intensity exercise.    [place]ell how to cook.    Is needing to find newliving situation because when father  he left home occupational therapy stepmother.    Is leaning to read food labels.    Works closely with psychiatrist.  Not currently in therapy- will reach out if needs to return.    Has had two FIT/Cologuard tests that were negative.    Have you ever done Advance Care Planning? (For example, a Health Directive, POLST, or a discussion with a medical provider or your loved ones about your wishes): No, advance care planning information given to patient to review.  Patient plans to discuss their wishes with loved ones or provider.      Social History     Tobacco Use    Smoking  status: Former     Packs/day: 1.00     Years: 10.00     Pack years: 10.00     Types: Cigarettes     Quit date: 9/8/2020     Years since quitting: 3.0    Smokeless tobacco: Never   Substance Use Topics    Alcohol use: Not Currently     Comment: quit drinking 9/2020 9/13/2023     9:23 AM   Alcohol Use   Prescreen: >3 drinks/day or >7 drinks/week? Not Applicable     Reviewed orders with patient.  Reviewed health maintenance and updated orders accordingly - Yes  Lab work is in process    Breast Cancer Screening:    FHS-7:        No data to display                Mammogram Screening: Recommended mammography every 1-2 years with patient discussion and risk factor consideration  Pertinent mammograms are reviewed under the imaging tab.    History of abnormal Pap smear: NO - age 30-65 PAP every 5 years with negative HPV co-testing recommended      4/17/2014    12:00 AM   PAP / HPV   PAP (Historical) ASC-US      Reviewed and updated as needed this visit by clinical staff   Tobacco  Allergies  Meds  Problems  Med Hx  Surg Hx  Fam Hx          Reviewed and updated as needed this visit by Provider   Tobacco  Allergies  Meds  Problems  Med Hx  Surg Hx  Fam Hx             Review of Systems   Constitutional:  Negative for chills and fever.   HENT:  Negative for congestion, ear pain, hearing loss and sore throat.    Eyes:  Negative for pain and visual disturbance.   Respiratory:  Negative for cough and shortness of breath.    Cardiovascular:  Negative for chest pain, palpitations and peripheral edema.   Gastrointestinal:  Negative for abdominal pain, constipation, diarrhea, heartburn, hematochezia and nausea.   Breasts:  Negative for tenderness, breast mass and discharge.   Genitourinary:  Positive for frequency and urgency. Negative for dysuria, genital sores, hematuria, pelvic pain, vaginal bleeding and vaginal discharge.   Musculoskeletal:  Negative for arthralgias, joint swelling and myalgias.   Skin:   "Negative for rash.   Neurological:  Negative for dizziness, weakness, headaches and paresthesias.   Psychiatric/Behavioral:  Negative for mood changes. The patient is not nervous/anxious.           OBJECTIVE:   /80 (BP Location: Right arm, Patient Position: Sitting, Cuff Size: Adult Regular)   Pulse 70   Temp 97.4  F (36.3  C) (Temporal)   Resp 20   Ht 1.63 m (5' 4.17\")   Wt 96.3 kg (212 lb 3.2 oz)   LMP  (LMP Unknown)   SpO2 100%   BMI 36.23 kg/m    Physical Exam  GENERAL APPEARANCE: healthy, alert and no distress  EYES: Eyes grossly normal to inspection, PERRL and conjunctivae and sclerae normal  HENT: ear canals and TM's normal, nose and mouth without ulcers or lesions, oropharynx clear and oral mucous membranes moist  NECK: no adenopathy, no asymmetry, masses, or scars and thyroid normal to palpation  RESP: lungs clear to auscultation - no rales, rhonchi or wheezes  BREAST: normal without masses, tenderness or nipple discharge and no palpable axillary masses or adenopathy  CV: regular rate and rhythm, normal S1 S2, no S3 or S4, no murmur, click or rub, no peripheral edema and peripheral pulses strong  ABDOMEN: soft, nontender, no hepatosplenomegaly, no masses and bowel sounds normal  MS: no musculoskeletal defects are noted and gait is age appropriate without ataxia  SKIN: no suspicious lesions or rashes  NEURO: Normal strength and tone, sensory exam grossly normal, mentation intact and speech normal  PSYCH: mentation appears normal and affect normal/bright    Diagnostic Test Results:  Labs reviewed in Epic    ASSESSMENT/PLAN:   Porsha was seen today for physical, establish care and refill request.    Diagnoses and all orders for this visit:    Routine general medical examination at a health care facility  -     Lipid panel reflex to direct LDL Non-fasting; Future  -     Hemoglobin A1c; Future  -     Lipid panel reflex to direct LDL Non-fasting  -     Hemoglobin A1c  Mammo: 3/28/23 negative  Pap: " ASCUS 4/17/14, HPV negative; Pap 3/28/22 HPV neg; due 2027  Colon: Colonoscopy referral placed  Immunizations: Flu shot today; COVID and RSV when available  Discussed healthy lifestyle and aging recommendations including regular exercise, adequate and regular sleep, 5+ fruits and veggies daily.    Screen for colon cancer  -     Colonoscopy Screening  Referral; Future    Screening for HIV (human immunodeficiency virus)  -     HIV Antigen Antibody Combo; Future  -     HIV Antigen Antibody Combo    Need for hepatitis C screening test  -     Hepatitis C Screen Reflex to HCV RNA Quant and Genotype; Future  -     Hepatitis C Screen Reflex to HCV RNA Quant and Genotype    HTN, goal below 140/90  Comments:  At goal based on office BP today.  Cont amlodipine 5 mg daily.  Check BP at home.  Orders:  -     amLODIPine (NORVASC) 5 MG tablet; Take 1 tablet (5 mg) by mouth every morning  -     Home Blood Pressure Monitor Order for DME - ONLY FOR DME    Foreign body in skin  Comments:  Left wrist with palpable firm object in vein- wonder if could be IV catheter versus thrombophlebitis.  Orders:  -     XR Hand Left G/E 3 Views; Future    Urinary urgency  Comments:  Oxybutynin works well.  Continue.  Orders:  -     oxyBUTYnin (DITROPAN) 5 MG tablet; Take 1 tablet (5 mg) by mouth 2 times daily    Adrenal incidentaloma (H)  Comments:  Noted on MR abdomen.  No history findings of hormonally active tumor- will check blood pressures at home and check A1C today.  Orders:  -     Cortisol; Future  -     DHEA sulfate; Future  -     Cortisol  -     DHEA sulfate    Encounter for screening mammogram for breast cancer  -     MA Screen Bilateral w/Domo; Future    Class 2 severe obesity due to excess calories with serious comorbidity and body mass index (BMI) of 36.0 to 36.9 in adult (H)  Comments:  Eating healthy, exercising.  Feeling good.    Recurrent major depressive disorder, in partial remission (H)  Comments:  Followed by  "Psychiatry, managed well on vilazodone.    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  MRI brain 7/17/23- punctate metastases in right middle cerebellar peduncle.  Follow up scans Jan 2024.    Other orders  -     REVIEW OF HEALTH MAINTENANCE PROTOCOL ORDERS  -     INFLUENZA VACCINE 18-64Y (FLUBLOK)        Patient has been advised of split billing requirements and indicates understanding: Yes      COUNSELING:  Reviewed preventive health counseling, as reflected in patient instructions      BMI:   Estimated body mass index is 36.23 kg/m  as calculated from the following:    Height as of this encounter: 1.63 m (5' 4.17\").    Weight as of this encounter: 96.3 kg (212 lb 3.2 oz).   Weight management plan: discussed health living      She reports that she quit smoking about 3 years ago. Her smoking use included cigarettes. She has a 10.00 pack-year smoking history. She has never used smokeless tobacco.          Radha Francisco, [facility] HospitalAnswers submitted by the patient for this visit:  Patient Health Questionnaire (Submitted on 9/20/2023)  If you checked off any problems, how difficult have these problems made it for you to do your work, take care of things at home, or get along with other people?: Not difficult at all  PHQ9 TOTAL SCORE: 5    "

## 2023-09-20 NOTE — PATIENT INSTRUCTIONS
Please buy a blood pressure cuff, check validatebp.org if you have questions which one to buy (should go on the upper arm).    Check your blood pressure twice each morning (if you drink coffee, before you have coffee ideally) and twice daily each evening and write down your results, do this for 3 days. Send your results via [employer] or bring to your next visit. Goal blood pressure is less than 130/80.     Home Blood Pressure Monitoring:    To prepare to take your blood pressure:  Do not consume any caffeinated beverages (tea, coffee, soda), do not smoke, do not exercise for 30 minutes before measuring your blood pressure.  Sit quietly for at least 5 minutes before starting to measure your blood pressure.     To measure your blood pressure:  Sit with both feet flat on the floor and your back supported. Do not stand, do not lie down.  Place the cuff on your arm above your elbow so that your elbow can bend comfortably.  Your arm should be resting on a table.  Pull the cuff tight enough to stay in place and allow for your fingers to fit between your arm and the cuff.  Position the cuff so that the cord lies down the inside of your arm.  Do not talk while you are using the machine.  Press the START button. It will squeeze your arm and then release slowly.   When you see the numbers on the screen, you are done.   Write the numbers down and the time of day so that you can track what they are.      Hypertension is the major risk factor for chronic kidney disease and stroke.  A systolic blood pressure (the upper number) of 150 is associated with an 8-fold increased risk of stroke compared to a systolic blood pressure of 110.      Hypertension can be treated with diet, exercise, and medication.  Some patients need medication to lower their blood pressure.    The DASH diet can help lower blood pressure.  It is a plant-based diet that focuses on fruits, vegetables, whole grains, low-fat dairy products, and very little meat.  It  includes one serving of nuts, seeds, or legumes per day.  Sodiums is limited to 2400 mg per day.          Diet Change    To help reduce blood pressure, it is recommended that individuals reduce their sodium content to 2,300 mg or 1,500 mg. Below are alternatives to high-sodium and high-fat foods.  Reducing Salt Content    Foods high in salt (sodium) Low-salt alternatives   smoked, cured, salted, and canned meat, fish, poultry unsalted fresh or frozen beef, lamb, pork, fish, poultry   regular hard and processed cheese  regular peanut butter low-sodium cheese  low-sodium peanut butter   crackers with salted tops unsalted crackers   regular canned and dehydrated soups  low-sodium soups, broths, bouillons   regular canned vegetables fresh and frozen vegetables    salted snack foods unsalted snack foods       Reducing Fat Content    Food category Foods high in fat Lower fat alternatives   Dairy  whole milk  ice cream    cheese  skim, 1%, 2% milk  sorbet, sherbert, frozen yogurt  low- or reduced-fat cheese   Pasta ramen noodles  pasta with cream sauce  granola rice  pasta with tomato sauce  reduced fat granola   Meat, fish, poultry ground beef  chicken or turkey with skin  hot dogs  sparrow sausage   oil packed tuna   whole eggs low-fat, extra-lean meats,  skinless chicken or turkey    low-fat hot dog    turkey sparrow  water-packed tuna  egg whites, egg substitute   Baked goods croissants   donuts  muffins,   party crackers  cake, cookies hard rolls, English muffins  bagels  reduced fat muffins  low-fat crackers  tor food cake   Snacks and sweets nuts  ice cream popcorn, fruits, vegetables  frozen yogurt, pudding bars   Fats, oils, and salad dressings butter, margarine  mayonnaise  salad dressings  oils, shortening, lard light margarine  light mayonnaise, mustard  fat free salad dressing  nonstick cooking spray     What are the changes that you plan to make in your diet?    Reduce salt  by:_________________________________________________________    Reduce saturated fat by:__________________________________________    Reducing salt content data from  Alternatives to High-Sodium Foods,  by the U.S. Food and Drug Administration, n.d. Retrieved January 9, 2007, from http://www.fda.gov/fdac/foodlabel/sodtabl.html. Reducing fat content data from  The Practical Guide: Identification, Evaluation and Treatment of Overweight and Obesity in Adults  (NIH Publication No. ), by the National Institutes of Health, 2000. Retrieved January 9, 2007, from http://www.nhlbi.nih.gov/ guidelines/obesity/prctgd_c.pdf.       Preventive Health Recommendations  Female Ages 50 - 64    Yearly exam: See your health care provider every year in order to  Review health changes.   Discuss preventive care.    Review your medicines if your doctor has prescribed any.    Get a Pap test every three years (unless you have an abnormal result and your provider advises testing more often).  If you get Pap tests with HPV test, you only need to test every 5 years, unless you have an abnormal result.   You do not need a Pap test if your uterus was removed (hysterectomy) and you have not had cancer.  You should be tested each year for STDs (sexually transmitted diseases) if you're at risk.   Have a mammogram every 1 to 2 years.  Have a colonoscopy at age 50, or have a yearly FIT test (stool test). These exams screen for colon cancer.    Have a cholesterol test every 5 years, or more often if advised.  Have a diabetes test (fasting glucose) every three years. If you are at risk for diabetes, you should have this test more often.   If you are at risk for osteoporosis (brittle bone disease), think about having a bone density scan (DEXA).    Shots: Get a flu shot each year. Get a tetanus shot every 10 years.    Nutrition:   Eat at least 5 servings of fruits and vegetables each day.  Eat whole-grain bread, whole-wheat pasta and brown rice  instead of white grains and rice.  Get adequate Calcium and Vitamin D.     Lifestyle  Exercise at least 150 minutes a week (30 minutes a day, 5 days a week). This will help you control your weight and prevent disease.  Limit alcohol to one drink per day.  No smoking.   Wear sunscreen to prevent skin cancer.   See your dentist every six months for an exam and cleaning.  See your eye doctor every 1 to 2 years.

## 2023-09-21 LAB
DHEA-S SERPL-MCNC: 53 UG/DL (ref 35–430)
HCV AB SERPL QL IA: NONREACTIVE
HIV 1+2 AB+HIV1 P24 AG SERPL QL IA: NONREACTIVE

## 2023-09-21 ASSESSMENT — ENCOUNTER SYMPTOMS
DEPRESSION: 1
INSOMNIA: 1
NERVOUS/ANXIOUS: 1
DECREASED CONCENTRATION: 0
PANIC: 0

## 2023-09-21 NOTE — TELEPHONE ENCOUNTER
DIAGNOSIS:   Foreign body in skin [T14.8XXA]  Left wrist with palpable firm object in vein- wonder if could be IV catheter versus thrombophlebitis.   APPOINTMENT DATE: 09/25/2023   NOTES STATUS DETAILS   OFFICE NOTE from referring provider Internal 09/20/2023 - Radha Francisco    MEDICATION LIST Internal    XRAYS (IMAGES & REPORTS) PACS Internal      Expected Patient Referral to ED  11:51 AM    Referring Clinic/Provider:  [facility]      Reason for referral/Clinical facts:  LLQ abscess hospital stay in Jan. Went to TCU. Followed up in March and had complicated Abscess, readmitted and drain placed,IR abscessogram 3/27 showed no residual abscess pocket.  Discharged 4/5. Now LLQ pain again. CT shows abscess returned. [facility] read. Abscess has decreased in size per read, but it was resolved previously. Not febrile. Will fax [facility] read.         Recommendations provided:  Send to ED for further evaluation    Caller was informed that this institution does possess the capabilities and/or resources to provide for patient and should be transferred to our facility.    Discussed that if direct admit is sought and any hurdles are encountered, this ED would be happy to see the patient and evaluate.    Informed caller that recommendations provided are recommendations based only on the facts provided and that they responsible to accept or reject the advice, or to seek a formal in person consultation as needed and that this ED will see/treat patient should they arrive.      Luis Aguilar MD  [facility] EMERGENCY DEPARTMENT  [address] [place] [id number] [phone]       Luis Aguilar MD  04/21/23 [id number]

## 2023-09-25 ENCOUNTER — OFFICE VISIT (OUTPATIENT)
Dept: ORTHOPEDICS | Facility: CLINIC | Age: 58
End: 2023-09-25
Attending: INTERNAL MEDICINE
Payer: COMMERCIAL

## 2023-09-25 ENCOUNTER — PRE VISIT (OUTPATIENT)
Dept: ORTHOPEDICS | Facility: CLINIC | Age: 58
End: 2023-09-25

## 2023-09-25 DIAGNOSIS — T14.8XXA FOREIGN BODY IN SKIN: ICD-10-CM

## 2023-09-25 PROCEDURE — 99203 OFFICE O/P NEW LOW 30 MIN: CPT | Performed by: STUDENT IN AN ORGANIZED HEALTH CARE EDUCATION/TRAINING PROGRAM

## 2023-09-25 NOTE — PROGRESS NOTES
"[facility] H&P Note:  Date: Sep 25, 2023    Patient Name: Porsha Ledezma  MRN: [id number]    Consult requested by: Radha Francisco    CHIEF COMPLAINT: Sclerosed vein dorsal ulnar wrist      HPI:  Ms. Porsha Ledezma is a 58 year old female who presents with a firm tubular mass over the dorsal ulnar head at the wrist. Patient had an MRI requiring IV contrast 7/17/23. She had an IV placed there, after which she noticed hardening and enlargement of the vein that has persisted. She is concerned that a catheter might have been retained as the IV was \"ripped out.\" It is not particularly painful but it is bothersome when she was wearing a watch.      PAST MEDICAL HISTORY:  Past Medical History:   Diagnosis Date    ASCUS favor benign 04/01/2015    neg HPV. cotest in 3 years    Choroidal malignant melanoma, left (H)     Depression     Depressive disorder     Hypertension        PAST SURGICAL HISTORY:  Past Surgical History:   Procedure Laterality Date    ANESTHESIA OUT OF OR MRI N/A 7/17/2023    Procedure: Anesthesia OUT OF THE OR Magnetic Resonance Imaging Brain;  Surgeon: GENERIC ANESTHESIA PROVIDER;  Location: [facility] OR    EXAM UNDER ANESTHESIA EYE(S) Left 7/17/2023    Procedure: Left Eye Immobilization @0730;  Surgeon: Madonna Corey MD;  Location: [facility] OR    PLACEMENT, HEAD FRAME N/A 7/17/2023    Procedure: Placement, Head Frame;  Surgeon: Kike Reynoso MD;  Location: [facility] OR    TOOTH EXTRACTION         MEDICATIONS:  Current Outpatient Medications   Medication    amLODIPine (NORVASC) 5 MG tablet    multivitamin w/minerals (MULTI-VITAMIN) tablet    oxyBUTYnin (DITROPAN) 5 MG tablet    vilazodone (VIIBRYD) 40 MG TABS tablet     No current facility-administered medications for this visit.       ALLERGIES:     Allergies   Allergen Reactions    Bee Venom     Codeine     Penicillins        FAMILY HISTORY:  No pertinent family history    SOCIAL HISTORY:  Social " History     Tobacco Use    Smoking status: Former     Packs/day: 1.00     Years: 10.00     Pack years: 10.00     Types: Cigarettes     Quit date: 9/8/2020     Years since quitting: 3.0    Smokeless tobacco: Never   Substance Use Topics    Alcohol use: Not Currently     Comment: quit drinking 9/2020    Drug use: Yes     Types: Marijuana     Comment: Daily use       The patient's past medical, family, and social history was reviewed and confirmed.    REVIEW OF SYMPTOMS:    See end of note    VITALS:  There were no vitals filed for this visit.    EXAM:  General: NAD, A&Ox3  HEENT: NC/AT  CV: RRR by peripheral pulse  Pulmonary: Non-labored breathing on RA  LUE:  1cm long sclerosed vein over the dorsal aspect of the ulnar head. Bluish hue  There is a small punctate firmness just distal to this with similar appearance  The vein seems to have collapsed proximally and distally  Nontender  No other surrounding skin changes  Otherwise fully neurovascularly intact distally      IMAGING:    None  I have personally reviewed the above images and labs.         IMPRESSION AND RECOMMENDATIONS:  Ms. Porsha Ledezma is a 58 year old female with likely sclerosed vein over dorsal aspect of ulnar head at the wrist.     Discussed the diagnosis and treatment options with the patient.  We discussed continued observation, ultrasound to assess patency of the vein as well as presence of any retained foreign body, versus surgical ligation and excision.  I think that a retained catheter would be exceedingly unlikely.    The patient would like to proceed with ultrasound.  This has been ordered.    Telephone visit with me after the ultrasound to discuss results and further care.    All questions answered.  The patient voiced understanding and agreement with this plan.    Anderson Wise MD    Hand, Upper Extremity & Microvascular Surgery  Department of Orthopaedic Surgery  [facility]      Answers submitted by the  patient for this visit:  NEW VISIT on 9/25/2023  1:30 PM with Anderson Wise MD  Symptoms you have experienced in the last 30 days (Submitted on 9/21/2023)  General Symptoms: No  Skin Symptoms: No  HENT Symptoms: No  EYE SYMPTOMS: No  HEART SYMPTOMS: No  LUNG SYMPTOMS: No  INTESTINAL SYMPTOMS: No  URINARY SYMPTOMS: No  GYNECOLOGIC SYMPTOMS: No  BREAST SYMPTOMS: No  SKELETAL SYMPTOMS: No  BLOOD SYMPTOMS: No  NERVOUS SYSTEM SYMPTOMS: No  MENTAL HEALTH SYMPTOMS: Yes

## 2023-09-25 NOTE — LETTER
"    9/25/2023         RE: Porsha Ledezma  [address]  [facility] [address]        Dear Colleague,    Thank you for referring your patient, Porsha Ledezma, to the [facility] [place]. Please see a copy of my visit note below.    Orthopaedic Surgery Hand and Upper Extremity Clinic H&P Note:  Date: Sep 25, 2023    Patient Name: Porsha Ledezma  MRN: [id number]    Consult requested by: Radha Francisco    CHIEF COMPLAINT: Sclerosed vein dorsal ulnar wrist      HPI:  Ms. Porsha Ledezma is a 58 year old female who presents with a firm tubular mass over the dorsal ulnar head at the wrist. Patient had an MRI requiring IV contrast 7/17/23. She had an IV placed there, after which she noticed hardening and enlargement of the vein that has persisted. She is concerned that a catheter might have been retained as the IV was \"ripped out.\" It is not particularly painful but it is bothersome when she was wearing a watch.      PAST MEDICAL HISTORY:  Past Medical History:   Diagnosis Date    ASCUS favor benign 04/01/2015    neg HPV. cotest in 3 years    Choroidal malignant melanoma, left (H)     Depression     Depressive disorder     Hypertension        PAST SURGICAL HISTORY:  Past Surgical History:   Procedure Laterality Date    ANESTHESIA OUT OF OR MRI N/A 7/17/2023    Procedure: Anesthesia OUT OF THE OR Magnetic Resonance Imaging Brain;  Surgeon: GENERIC ANESTHESIA PROVIDER;  Location: [facility] OR    EXAM UNDER ANESTHESIA EYE(S) Left 7/17/2023    Procedure: Left Eye Immobilization @0730;  Surgeon: Madonna Corey MD;  Location: [facility] OR    PLACEMENT, HEAD FRAME N/A 7/17/2023    Procedure: Placement, Head Frame;  Surgeon: Kike Reynoso MD;  Location: [facility] OR    TOOTH EXTRACTION         MEDICATIONS:  Current Outpatient Medications   Medication    amLODIPine (NORVASC) 5 MG tablet    multivitamin w/minerals (MULTI-VITAMIN) tablet    oxyBUTYnin (DITROPAN) 5 MG tablet    vilazodone " (VIIBRYD) 40 MG TABS tablet     No current facility-administered medications for this visit.       ALLERGIES:     Allergies   Allergen Reactions    Bee Venom     Codeine     Penicillins        FAMILY HISTORY:  No pertinent family history    SOCIAL HISTORY:  Social History     Tobacco Use    Smoking status: Former     Packs/day: 1.00     Years: 10.00     Pack years: 10.00     Types: Cigarettes     Quit date: 9/8/2020     Years since quitting: 3.0    Smokeless tobacco: Never   Substance Use Topics    Alcohol use: Not Currently     Comment: quit drinking 9/2020    Drug use: Yes     Types: Marijuana     Comment: Daily use       The patient's past medical, family, and social history was reviewed and confirmed.    REVIEW OF SYMPTOMS:    See end of note    VITALS:  There were no vitals filed for this visit.    EXAM:  General: NAD, A&Ox3  HEENT: NC/AT  CV: RRR by peripheral pulse  Pulmonary: Non-labored breathing on RA  LUE:  1cm long sclerosed vein over the dorsal aspect of the ulnar head. Bluish hue  There is a small punctate firmness just distal to this with similar appearance  The vein seems to have collapsed proximally and distally  Nontender  No other surrounding skin changes  Otherwise fully neurovascularly intact distally      IMAGING:    None  I have personally reviewed the above images and labs.         IMPRESSION AND RECOMMENDATIONS:  Ms. Porsha Ledezma is a 58 year old female with likely sclerosed vein over dorsal aspect of ulnar head at the wrist.     Discussed the diagnosis and treatment options with the patient.  We discussed continued observation, ultrasound to assess patency of the vein as well as presence of any retained foreign body, versus surgical ligation and excision.  I think that a retained catheter would be exceedingly unlikely.    The patient would like to proceed with ultrasound.  This has been ordered.    Telephone visit with me after the ultrasound to discuss results and further care.    All  questions answered.  The patient voiced understanding and agreement with this plan.    Anderson Wise MD    Hand, Upper Extremity & Microvascular Surgery  Department of Orthopaedic Surgery  [facility]      Answers submitted by the patient for this visit:  NEW VISIT on 9/25/2023  1:30 PM with Anderson Wise MD  Symptoms you have experienced in the last 30 days (Submitted on 9/21/2023)  General Symptoms: No  Skin Symptoms: No  HENT Symptoms: No  EYE SYMPTOMS: No  HEART SYMPTOMS: No  LUNG SYMPTOMS: No  INTESTINAL SYMPTOMS: No  URINARY SYMPTOMS: No  GYNECOLOGIC SYMPTOMS: No  BREAST SYMPTOMS: No  SKELETAL SYMPTOMS: No  BLOOD SYMPTOMS: No  NERVOUS SYSTEM SYMPTOMS: No  MENTAL HEALTH SYMPTOMS: Yes

## 2023-09-25 NOTE — NURSING NOTE
Reason For Visit:   Chief Complaint   Patient presents with    Consult     Consult for palpable foreign body in left wrist, had recent surgery and feels that an IV or plastic was left in (7/17/23)       Primary MD: Radha Francisco  Ref. MD: Dr. Francisco    Age: 58 year old    ?  No      LMP  (LMP Unknown)       Pain Assessment  Patient Currently in Pain: Yes  0-10 Pain Scale: 0  Primary Pain Location: Wrist (left)  Pain Descriptors: Discomfort, Pressure, Other (comment) (swelling)    Hand Dominance Evaluation  Hand Dominance: Right          QuickDASH Assessment      9/21/2023    10:56 AM   QuickDASH Main   1. Open a tight or new jar Mild difficulty   2. Do heavy household chores (e.g., wash walls, floors) No difficulty   3. Carry a shopping bag or briefcase No difficulty   4. Wash your back No difficulty   5. Use a knife to cut food No difficulty   6. Recreational activities in which you take some force or impact through your arm, shoulder or hand (e.g., golf, hammering, tennis, etc.) No difficulty   7. During the past week, to what extent has your arm, shoulder or hand problem interfered with your normal social activities with family, friends, neighbours or groups Not at all   8. During the past week, were you limited in your work or other regular daily activities as a result of your arm, shoulder or hand problem Not limited at all   9. Arm, shoulder or hand pain None   10.Tingling (pins and needles) in your arm,shoulder or hand None   11. During the past week, how much difficulty have you had sleeping because of the pain in your arm, shoulder or hand No difficulty   Quickdash Ability Score 2.27          Current Outpatient Medications   Medication Sig Dispense Refill    amLODIPine (NORVASC) 5 MG tablet Take 1 tablet (5 mg) by mouth every morning 90 tablet 3    multivitamin w/minerals (MULTI-VITAMIN) tablet Take 1 tablet by mouth every morning      oxyBUTYnin (DITROPAN) 5 MG tablet Take 1 tablet  (5 mg) by mouth 2 times daily 180 tablet 3    vilazodone (VIIBRYD) 40 MG TABS tablet Take 40 mg by mouth every morning         Allergies   Allergen Reactions    Bee Venom     Codeine     Penicillins        Leila Chandra, ATC

## 2023-10-12 DIAGNOSIS — D31.32 CHOROIDAL NEVUS, LEFT EYE: Primary | ICD-10-CM

## 2023-10-12 DIAGNOSIS — C69.32 MALIGNANT MELANOMA OF CHOROID OF LEFT EYE (H): ICD-10-CM

## 2023-10-17 ENCOUNTER — TELEPHONE (OUTPATIENT)
Dept: OPHTHALMOLOGY | Facility: CLINIC | Age: 58
End: 2023-10-17

## 2023-10-17 NOTE — TELEPHONE ENCOUNTER
Spoke with pt and offered 2:40 PM today (Madhav had another pt cx at that time). Pt unable to make it and took first avail on 10/27 at 8:20 AM.    MAXIMUS Flores 1:09 PM October 17, 2023

## 2023-10-17 NOTE — TELEPHONE ENCOUNTER
Patient called and lvm stating that she over slept and the she would need to reschedule appointment. Routed to appropriate team.Kirsten Elizabeth on 10/17/2023 at 9:12 AM

## 2023-10-23 ENCOUNTER — TELEPHONE (OUTPATIENT)
Dept: GASTROENTEROLOGY | Facility: CLINIC | Age: 58
End: 2023-10-23

## 2023-10-27 ENCOUNTER — OFFICE VISIT (OUTPATIENT)
Dept: OPHTHALMOLOGY | Facility: CLINIC | Age: 58
End: 2023-10-27
Attending: OPHTHALMOLOGY
Payer: COMMERCIAL

## 2023-10-27 DIAGNOSIS — C69.32 MALIGNANT MELANOMA OF CHOROID OF LEFT EYE (H): ICD-10-CM

## 2023-10-27 PROCEDURE — G0463 HOSPITAL OUTPT CLINIC VISIT: HCPCS | Performed by: OPHTHALMOLOGY

## 2023-10-27 PROCEDURE — 99214 OFFICE O/P EST MOD 30 MIN: CPT | Mod: GC | Performed by: OPHTHALMOLOGY

## 2023-10-27 PROCEDURE — 92134 CPTRZ OPH DX IMG PST SGM RTA: CPT | Performed by: OPHTHALMOLOGY

## 2023-10-27 PROCEDURE — 92250 FUNDUS PHOTOGRAPHY W/I&R: CPT | Performed by: OPHTHALMOLOGY

## 2023-10-27 PROCEDURE — 76510 OPH US DX B-SCAN&QUAN A-SCAN: CPT | Performed by: OPHTHALMOLOGY

## 2023-10-27 PROCEDURE — 99207 FUNDUS PHOTOS OU (BOTH EYES): CPT | Mod: 26 | Performed by: OPHTHALMOLOGY

## 2023-10-27 ASSESSMENT — VISUAL ACUITY
METHOD: SNELLEN - LINEAR
CORRECTION_TYPE: GLASSES
OS_CC: 20/20-
OD_CC: 20/20

## 2023-10-27 ASSESSMENT — REFRACTION_WEARINGRX
OD_CYLINDER: +0.25
OD_SPHERE: -3.00
OS_CYLINDER: +0.50
OS_SPHERE: -2.75
OS_AXIS: 011
OD_AXIS: 173

## 2023-10-27 ASSESSMENT — TONOMETRY
OS_IOP_MMHG: 12
IOP_METHOD: TONOPEN
OD_IOP_MMHG: 17

## 2023-10-27 ASSESSMENT — CONF VISUAL FIELD: OS_INFERIOR_TEMPORAL_RESTRICTION: 3

## 2023-10-27 ASSESSMENT — EXTERNAL EXAM - LEFT EYE: OS_EXAM: NORMAL

## 2023-10-27 ASSESSMENT — EXTERNAL EXAM - RIGHT EYE: OD_EXAM: NORMAL

## 2023-10-27 ASSESSMENT — CUP TO DISC RATIO: OS_RATIO: 0.3

## 2023-10-27 ASSESSMENT — SLIT LAMP EXAM - LIDS: COMMENTS: NORMAL

## 2023-10-27 NOTE — PROGRESS NOTES
"CC -   CMM OS    INTERVAL HISTORY - Here for 4 month follow up choroidal nevus of left eye, s/p gamma knife left eye 7/17/2023   She feels the blind spot may be a little larger   No eye pain  No ocular meds     PMH -   Porsha NELLI Ledezma is a  58 year old year-old patient with history of CMM OS diagnosed 2023    Had eye exam in 5/2021 showing nevus OS, returned 5/2023 and lesion much larger  No vision symptoms  Recalls was told about \"shadow\" in eye in years past as well    No DM, no HTn, no prior CA    PAST OCULAR SURGERY  Gamma Knife OS 7/17/23        RETINAL IMAGING:  OCT 10/27/23  OD - macula normal, PHF attached  OS - mild SUBRETINAL FLUID SN macula, PHF attached   periphery - elevated lesion worsening with worsening with SRF, choroid abnormal juxtafoveal      U/S OS  A-scan - medium reflective  B-scan - elevated lesion no extension, +choroidal excavation,  size 4.17mm x 11.58T x 10.33L (06/12/23)->4.11 x 11.92T x 13.57L(10/27/23)      ASSESSMENT & PLAN    # CMM left eye s/p Gamma Knife  7/17/2023    - substantial size growth 2021 to 2023 (images viewed on patient's phone, will upload to [facility])  - no Bx obtained     - stable on U/S   - could consider Avastin to prevent radiation retinopathy   - patient will return for injection   - patient willing to sign ABN     - r/b/a IVT anti-VEGF d/w patient   - infection, blindness, need for surgery   - off-label use of Avastin        Return in about 1 week (around 11/3/2023) for Tech Instructions:, Injection Only - No dilate No image, Avastin, Injection OS.       Talat Edwards MD MPH  Vitreoretinal Fellow PGY-6  [facility]       ATTESTATION     Attending Physician Attestation:      Complete documentation of historical and exam elements from today's encounter can be found in the full encounter summary report (not reduplicated in this progress note).  I personally obtained the chief complaint(s) and history of present illness.  I confirmed and edited as " necessary the review of systems, past medical/surgical history, family history, social history, and examination findings as documented by others; and I examined the patient myself.  I personally reviewed the relevant tests, images, and reports as documented above.  I personally reviewed the ophthalmic test(s) associated with this encounter, agree with the interpretation(s) as documented by the resident/fellow, and have edited the corresponding report(s) as necessary.   I formulated and edited as necessary the assessment and plan and discussed the findings and management plan with the patient and family    Madonna Corey MD, PhD  , Vitreoretinal Surgery  Department of Ophthalmology  [facility]

## 2023-11-03 ENCOUNTER — MYC MEDICAL ADVICE (OUTPATIENT)
Dept: FAMILY MEDICINE | Facility: CLINIC | Age: 58
End: 2023-11-03

## 2023-11-03 ENCOUNTER — TELEPHONE (OUTPATIENT)
Dept: OPHTHALMOLOGY | Facility: CLINIC | Age: 58
End: 2023-11-03

## 2023-11-03 NOTE — TELEPHONE ENCOUNTER
[facility] Call Center    Phone Message    May a detailed message be left on voicemail: yes     Reason for Call: Other: Pt is returning 's call. States she wasn't aware of the number but will like another call     Action Taken: Message routed to:  [facility] eye    Travel Screening: Not Applicable

## 2023-11-06 NOTE — TELEPHONE ENCOUNTER
Message sent via [employer].    Bridgett Aguero, RN, BSN, PHN  [facility]  [phone]

## 2023-11-09 ENCOUNTER — ANCILLARY PROCEDURE (OUTPATIENT)
Dept: ULTRASOUND IMAGING | Facility: CLINIC | Age: 58
End: 2023-11-09
Attending: STUDENT IN AN ORGANIZED HEALTH CARE EDUCATION/TRAINING PROGRAM
Payer: COMMERCIAL

## 2023-11-09 DIAGNOSIS — C69.32 MALIGNANT MELANOMA OF CHOROID OF LEFT EYE (H): Primary | ICD-10-CM

## 2023-11-09 DIAGNOSIS — T14.8XXA FOREIGN BODY IN SKIN: ICD-10-CM

## 2023-11-09 PROCEDURE — 76882 US LMTD JT/FCL EVL NVASC XTR: CPT | Mod: GC | Performed by: RADIOLOGY

## 2023-11-13 ENCOUNTER — OFFICE VISIT (OUTPATIENT)
Dept: ORTHOPEDICS | Facility: CLINIC | Age: 58
End: 2023-11-13
Payer: COMMERCIAL

## 2023-11-13 DIAGNOSIS — I80.9 THROMBOPHLEBITIS: Primary | ICD-10-CM

## 2023-11-13 PROCEDURE — 99213 OFFICE O/P EST LOW 20 MIN: CPT | Performed by: STUDENT IN AN ORGANIZED HEALTH CARE EDUCATION/TRAINING PROGRAM

## 2023-11-13 NOTE — PROGRESS NOTES
"Orthopaedic Surgery Hand and Upper Extremity Clinic Progress Note  Date: Nov 13, 2023    Patient Name: Porsha Ledezma  MRN: [id number]    Consult requested by: Radha Francisco    CHIEF COMPLAINT: Sclerosed vein dorsal ulnar wrist      HPI:  Ms. Porsha Ledezma is a 58 year old female who presents with a firm tubular mass over the dorsal ulnar head at the wrist. Patient had an MRI requiring IV contrast 7/17/23. She had an IV placed there, after which she noticed hardening and enlargement of the vein that has persisted. She is concerned that a catheter might have been retained as the IV was \"ripped out.\" It is not particularly painful but it is bothersome when she was wearing a watch.    She had an ultrasound completed and is here to discuss results.      VITALS:  There were no vitals filed for this visit.    EXAM:  General: NAD, A&Ox3  HEENT: NC/AT  CV: RRR by peripheral pulse  Pulmonary: Non-labored breathing on RA  LUE:  1cm long sclerosed vein over the dorsal aspect of the ulnar head. Bluish hue  There is a small punctate firmness just distal to this with similar appearance  The vein seems to have collapsed proximally and distally  Nontender  No other surrounding skin changes  Otherwise fully neurovascularly intact distally      IMAGING:  Ultrasound 11/9/23    Impression:   Short segment of superficial thrombophlebitis in the dorsal left  wrist, corresponding to the site of the palpable abnormality.    I have personally reviewed the above images and labs.         IMPRESSION AND RECOMMENDATIONS:  Ms. Porsha Ledezma is a 58 year old female with thrombophlebitis of superficial vein over dorsal ulnar wrist    Discussed the diagnosis and treatment options with the patient.    We discussed that many cases of thrombophlebitis will resolve spontaneously. She has not tried anything like massage or compresses.    We did discuss that the vein can be ligated and excised if it is very bothersome. She states she " has not even thought about conservative measures because it hasn't been bothersome so deferred for now.    All questions answered, she voiced understanding and agreement. Follow-up with me as needed    Anderson Wise MD    Hand, Upper Extremity & Microvascular Surgery  Department of Orthopaedic Surgery  [facility]

## 2023-11-13 NOTE — LETTER
"    11/13/2023         RE: Porsha Ledezma  [address]  [facility] [address]        Dear Colleague,    Thank you for referring your patient, Porsha Ledezma, to the [facility] [place]. Please see a copy of my visit note below.    Orthopaedic Surgery Hand and Upper Extremity Clinic Progress Note  Date: Nov 13, 2023    Patient Name: Porsha Ledezma  MRN: [id number]    Consult requested by: Radha Francisco    CHIEF COMPLAINT: Sclerosed vein dorsal ulnar wrist      HPI:  Ms. Porsha Ledezma is a 58 year old female who presents with a firm tubular mass over the dorsal ulnar head at the wrist. Patient had an MRI requiring IV contrast 7/17/23. She had an IV placed there, after which she noticed hardening and enlargement of the vein that has persisted. She is concerned that a catheter might have been retained as the IV was \"ripped out.\" It is not particularly painful but it is bothersome when she was wearing a watch.    She had an ultrasound completed and is here to discuss results.      VITALS:  There were no vitals filed for this visit.    EXAM:  General: NAD, A&Ox3  HEENT: NC/AT  CV: RRR by peripheral pulse  Pulmonary: Non-labored breathing on RA  LUE:  1cm long sclerosed vein over the dorsal aspect of the ulnar head. Bluish hue  There is a small punctate firmness just distal to this with similar appearance  The vein seems to have collapsed proximally and distally  Nontender  No other surrounding skin changes  Otherwise fully neurovascularly intact distally      IMAGING:  Ultrasound 11/9/23    Impression:   Short segment of superficial thrombophlebitis in the dorsal left  wrist, corresponding to the site of the palpable abnormality.    I have personally reviewed the above images and labs.         IMPRESSION AND RECOMMENDATIONS:  Ms. Porsha Ledezma is a 58 year old female with thrombophlebitis of superficial vein over dorsal ulnar wrist    Discussed the diagnosis and " treatment options with the patient.    We discussed that many cases of thrombophlebitis will resolve spontaneously. She has not tried anything like massage or compresses.    We did discuss that the vein can be ligated and excised if it is very bothersome. She states she has not even thought about conservative measures because it hasn't been bothersome so deferred for now.    All questions answered, she voiced understanding and agreement. Follow-up with me as needed    Anderson Wise MD    Hand, Upper Extremity & Microvascular Surgery  Department of Orthopaedic Surgery  [facility]

## 2023-11-13 NOTE — NURSING NOTE
Reason For Visit:   Chief Complaint   Patient presents with    RECHECK     Follow up on left wrist foreign body after US       Primary MD: Radha Francisco  Ref. MD: jevon    Age: 58 year old    ?  No      LMP  (LMP Unknown)       Pain Assessment  Patient Currently in Pain: Yes  0-10 Pain Scale: 3  Primary Pain Location: Wrist (left)  Pain Descriptors: Aching, Dull, Nagging    Hand Dominance Evaluation  Hand Dominance: Right          QuickDASH Assessment      11/13/2023    10:28 AM   QuickDASH Main   1. Open a tight or new jar No difficulty   2. Do heavy household chores (e.g., wash walls, floors) No difficulty   3. Carry a shopping bag or briefcase No difficulty   4. Wash your back No difficulty   5. Use a knife to cut food No difficulty   6. Recreational activities in which you take some force or impact through your arm, shoulder or hand (e.g., golf, hammering, tennis, etc.) No difficulty   7. During the past week, to what extent has your arm, shoulder or hand problem interfered with your normal social activities with family, friends, neighbours or groups Not at all   8. During the past week, were you limited in your work or other regular daily activities as a result of your arm, shoulder or hand problem Not limited at all   9. Arm, shoulder or hand pain None   10.Tingling (pins and needles) in your arm,shoulder or hand None   11. During the past week, how much difficulty have you had sleeping because of the pain in your arm, shoulder or hand No difficulty   Quickdash Ability Score 0          Current Outpatient Medications   Medication Sig Dispense Refill    amLODIPine (NORVASC) 5 MG tablet Take 1 tablet (5 mg) by mouth every morning 90 tablet 3    multivitamin w/minerals (MULTI-VITAMIN) tablet Take 1 tablet by mouth every morning      oxyBUTYnin (DITROPAN) 5 MG tablet Take 1 tablet (5 mg) by mouth 2 times daily 180 tablet 3    vilazodone (VIIBRYD) 40 MG TABS tablet Take 40 mg by mouth every  morning         Allergies   Allergen Reactions    Bee Venom     Codeine     Morphine And Related Hives    Penicillins        Leila Chandra, ATC

## 2023-11-17 ENCOUNTER — OFFICE VISIT (OUTPATIENT)
Dept: OPHTHALMOLOGY | Facility: CLINIC | Age: 58
End: 2023-11-17
Attending: OPHTHALMOLOGY
Payer: COMMERCIAL

## 2023-11-17 DIAGNOSIS — C69.32 MALIGNANT MELANOMA OF CHOROID OF LEFT EYE (H): Primary | ICD-10-CM

## 2023-11-17 PROCEDURE — 67028 INJECTION EYE DRUG: CPT | Mod: LT | Performed by: OPHTHALMOLOGY

## 2023-11-17 PROCEDURE — 92134 CPTRZ OPH DX IMG PST SGM RTA: CPT | Performed by: OPHTHALMOLOGY

## 2023-11-17 PROCEDURE — 250N000011 HC RX IP 250 OP 636: Performed by: OPHTHALMOLOGY

## 2023-11-17 RX ADMIN — Medication 1.25 MG: at 09:36

## 2023-11-17 ASSESSMENT — TONOMETRY
OS_IOP_MMHG: 15
IOP_METHOD: TONOPEN
OD_IOP_MMHG: 12

## 2023-11-17 ASSESSMENT — REFRACTION_WEARINGRX
OD_CYLINDER: +0.25
OS_SPHERE: -2.75
OS_CYLINDER: +0.50
OD_AXIS: 173
OS_AXIS: 011
OD_SPHERE: -3.00

## 2023-11-17 ASSESSMENT — VISUAL ACUITY
OS_CC: 20/20
CORRECTION_TYPE: GLASSES
OD_CC: 20/20
METHOD: SNELLEN - LINEAR

## 2023-11-17 NOTE — NURSING NOTE
Chief Complaints and History of Present Illnesses   Patient presents with    Follow Up     Malignant melanoma of choroid of left eye     Chief Complaint(s) and History of Present Illness(es)       Follow Up              Comments: Malignant melanoma of choroid of left eye              Comments    Pt here for injection only appointment   No new problems or concern    Jayne Glynn COT 8:46 AM November 17, 2023

## 2023-11-17 NOTE — PROGRESS NOTES
"CC -   CMM OS    INTERVAL HISTORY - Pt here for injection only appointment   No new problems or concern    PMH -   Porsha Ledezma is a  58 year old year-old patient with history of CMM OS diagnosed 2023    Had eye exam in 5/2021 showing nevus OS, returned 5/2023 and lesion much larger  No vision symptoms  Recalls was told about \"shadow\" in eye in years past as well    No DM, no HTn, no prior CA    PAST OCULAR SURGERY  Gamma Knife OS 7/17/23        RETINAL IMAGING:  OCT 10/27/23  OD - macula normal, PHF attached  OS - mild SUBRETINAL FLUID SN macula, PHF attached   periphery - elevated lesion worsening with worsening with SRF, choroid abnormal juxtafoveal      U/S OS  A-scan - medium reflective  B-scan - elevated lesion no extension, +choroidal excavation,  size 4.17mm x 11.58T x 10.33L (06/12/23)->4.11 x 11.92T x 13.57L(10/27/23)      ASSESSMENT & PLAN    # CMM left eye s/p Gamma Knife  7/17/2023    - substantial size growth 2021 to 2023 (images viewed on patient's phone, will upload to [facility])  - no Bx obtained     - last U/S 10/27/23   - stable on last  U/S    - next U/S in 4 month interval (~2/27/24)     - plan Avastin to prevent radiation retinopathy   - patient willing to sign ABN   - start Avastin 11/17/23     - next injection 4 month interval        Return in about 4 months (around 3/17/2024) for DFE OU, OCT OU, [employer] Photo, US OS, Injection OS, Avastin.       Talat Edwards MD MPH  Vitreoretinal Fellow PGY-6  [facility]       ATTESTATION     Attending Physician Attestation:      Complete documentation of historical and exam elements from today's encounter can be found in the full encounter summary report (not reduplicated in this progress note).  I personally obtained the chief complaint(s) and history of present illness.  I confirmed and edited as necessary the review of systems, past medical/surgical history, family history, social history, and examination findings as documented by others; and " I examined the patient myself.  I personally reviewed the relevant tests, images, and reports as documented above.  I personally reviewed the ophthalmic test(s) associated with this encounter, agree with the interpretation(s) as documented by the resident/fellow, and have edited the corresponding report(s) as necessary.   I formulated and edited as necessary the assessment and plan and discussed the findings and management plan with the patient and family and I was present for the entire procedure performed by the resident/fellow.    Madonna Corey MD, PhD  , Vitreoretinal Surgery  Department of Ophthalmology  [facility]

## 2024-01-02 ENCOUNTER — LAB (OUTPATIENT)
Dept: LAB | Facility: CLINIC | Age: 59
End: 2024-01-02
Payer: COMMERCIAL

## 2024-01-02 ENCOUNTER — ANCILLARY PROCEDURE (OUTPATIENT)
Dept: MRI IMAGING | Facility: CLINIC | Age: 59
End: 2024-01-02
Attending: INTERNAL MEDICINE
Payer: COMMERCIAL

## 2024-01-02 ENCOUNTER — ANCILLARY PROCEDURE (OUTPATIENT)
Dept: CT IMAGING | Facility: CLINIC | Age: 59
End: 2024-01-02
Attending: INTERNAL MEDICINE
Payer: COMMERCIAL

## 2024-01-02 DIAGNOSIS — C69.32 MALIGNANT MELANOMA OF CHOROID OF LEFT EYE (H): ICD-10-CM

## 2024-01-02 LAB
ALBUMIN SERPL BCG-MCNC: 4 G/DL (ref 3.5–5.2)
ALP SERPL-CCNC: 59 U/L (ref 40–150)
ALT SERPL W P-5'-P-CCNC: 16 U/L (ref 0–50)
ANION GAP SERPL CALCULATED.3IONS-SCNC: 7 MMOL/L (ref 7–15)
AST SERPL W P-5'-P-CCNC: 16 U/L (ref 0–45)
BASOPHILS # BLD AUTO: 0 10E3/UL (ref 0–0.2)
BASOPHILS NFR BLD AUTO: 1 %
BILIRUB SERPL-MCNC: 0.8 MG/DL
BUN SERPL-MCNC: 11.6 MG/DL (ref 8–23)
CALCIUM SERPL-MCNC: 9.3 MG/DL (ref 8.8–10.2)
CHLORIDE SERPL-SCNC: 106 MMOL/L (ref 98–107)
CREAT SERPL-MCNC: 0.73 MG/DL (ref 0.51–0.95)
DEPRECATED HCO3 PLAS-SCNC: 28 MMOL/L (ref 22–29)
EGFRCR SERPLBLD CKD-EPI 2021: >90 ML/MIN/1.73M2
EOSINOPHIL # BLD AUTO: 0.1 10E3/UL (ref 0–0.7)
EOSINOPHIL NFR BLD AUTO: 2 %
ERYTHROCYTE [DISTWIDTH] IN BLOOD BY AUTOMATED COUNT: 13.2 % (ref 10–15)
GLUCOSE SERPL-MCNC: 106 MG/DL (ref 70–99)
HCT VFR BLD AUTO: 39.6 % (ref 35–47)
HGB BLD-MCNC: 13.1 G/DL (ref 11.7–15.7)
IMM GRANULOCYTES # BLD: 0 10E3/UL
IMM GRANULOCYTES NFR BLD: 0 %
LYMPHOCYTES # BLD AUTO: 0.9 10E3/UL (ref 0.8–5.3)
LYMPHOCYTES NFR BLD AUTO: 15 %
MCH RBC QN AUTO: 29.4 PG (ref 26.5–33)
MCHC RBC AUTO-ENTMCNC: 33.1 G/DL (ref 31.5–36.5)
MCV RBC AUTO: 89 FL (ref 78–100)
MONOCYTES # BLD AUTO: 0.4 10E3/UL (ref 0–1.3)
MONOCYTES NFR BLD AUTO: 7 %
NEUTROPHILS # BLD AUTO: 4.2 10E3/UL (ref 1.6–8.3)
NEUTROPHILS NFR BLD AUTO: 75 %
NRBC # BLD AUTO: 0 10E3/UL
NRBC BLD AUTO-RTO: 0 /100
PLATELET # BLD AUTO: 169 10E3/UL (ref 150–450)
POTASSIUM SERPL-SCNC: 3.9 MMOL/L (ref 3.4–5.3)
PROT SERPL-MCNC: 6.5 G/DL (ref 6.4–8.3)
RBC # BLD AUTO: 4.46 10E6/UL (ref 3.8–5.2)
SODIUM SERPL-SCNC: 141 MMOL/L (ref 135–145)
WBC # BLD AUTO: 5.7 10E3/UL (ref 4–11)

## 2024-01-02 PROCEDURE — 71250 CT THORAX DX C-: CPT | Performed by: RADIOLOGY

## 2024-01-02 PROCEDURE — 85025 COMPLETE CBC W/AUTO DIFF WBC: CPT | Performed by: PATHOLOGY

## 2024-01-02 PROCEDURE — 80053 COMPREHEN METABOLIC PANEL: CPT | Performed by: PATHOLOGY

## 2024-01-02 PROCEDURE — 74183 MRI ABD W/O CNTR FLWD CNTR: CPT | Performed by: RADIOLOGY

## 2024-01-02 PROCEDURE — A9581 GADOXETATE DISODIUM INJ: HCPCS | Performed by: RADIOLOGY

## 2024-01-02 PROCEDURE — 36415 COLL VENOUS BLD VENIPUNCTURE: CPT | Performed by: PATHOLOGY

## 2024-01-02 RX ORDER — GADOBUTROL 604.72 MG/ML
10 INJECTION INTRAVENOUS ONCE
Status: ACTIVE | OUTPATIENT
Start: 2024-01-02

## 2024-01-04 ENCOUNTER — VIRTUAL VISIT (OUTPATIENT)
Dept: ONCOLOGY | Facility: CLINIC | Age: 59
End: 2024-01-04
Attending: INTERNAL MEDICINE
Payer: COMMERCIAL

## 2024-01-04 VITALS — BODY MASS INDEX: 34.49 KG/M2 | HEIGHT: 64 IN | WEIGHT: 202 LBS

## 2024-01-04 DIAGNOSIS — C69.32 MALIGNANT MELANOMA OF CHOROID OF LEFT EYE (H): Primary | ICD-10-CM

## 2024-01-04 PROCEDURE — 99214 OFFICE O/P EST MOD 30 MIN: CPT | Mod: 95 | Performed by: PHYSICIAN ASSISTANT

## 2024-01-04 ASSESSMENT — PAIN SCALES - GENERAL: PAINLEVEL: NO PAIN (0)

## 2024-01-04 NOTE — LETTER
1/4/2024         RE: Porsha Ledezma  [address]  [facility] [address]        Dear Colleague,    Thank you for referring your patient, Porsha Ledezma, to the [facility]. Please see a copy of my visit note below.    Virtual Visit Details    Type of service:  Video Visit   Video Start Time: 8:58 AM  Video End Time:9:09 AM    Originating Location (pt. Location): Home  Distant Location (provider location):  On-site  Platform used for Video Visit: [facility]    Oncology/Hematology Visit Note  Jan 4, 2024    Reason for Visit: follow up of choroidal melanoma, left eye, review imaging    History of Present Illness:   Porsha Ledezma is a 58 year old female with choroidal melanoma of the left eye.     Several years ago was told she had a shadow in the eye.  May 2021; pt had eye exam, this showed a nevus left eye   May 2023; return eye exam, nevus left eye increased in size   6/12/23; pt saw Dr. Corey in consult. OCT shows elevated lesion with SRF, and juxtafoveal location. B-scan ultrasound showed elevated lesion no extension, + choroidal excavation Size: 4.17mm x 11.58 mmT x 10.33 mmL. Presented options, Dr. Corey favoring Gamma Knife due to juxtapapillary location   6/22/23; CT CAP shows indeterminate 2 cm left adrenal nodule, indeterminate bilateral renal masses measuring up to 1 cm (MRI Abd recommended).  No other evidence of metastasis.  7/5/23; Pelvic ultrasound shows 2.9 cm left ovarian cyst without solid components or septations. No follow up required per SRU guidelines.  7/17/23; completed gammaknife to left eye    Interval History:  -Feeling well overall.  -Will be moving out of her home and has stress with this.   -Father passed away last year.   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   -Vision is a little worse. Has occasional white flashes across the top of her eye. Also, notes some increase in dots in vision.  -Has a therapist that she sees on as needed basis.  Also, feels supported by family and friends.       Current Outpatient Medications   Medication Sig Dispense Refill    amLODIPine (NORVASC) 5 MG tablet Take 1 tablet (5 mg) by mouth every morning 90 tablet 3    multivitamin w/minerals (MULTI-VITAMIN) tablet Take 1 tablet by mouth every morning      oxyBUTYnin (DITROPAN) 5 MG tablet Take 1 tablet (5 mg) by mouth 2 times daily 180 tablet 3    vilazodone (VIIBRYD) 40 MG TABS tablet Take 40 mg by mouth every morning       Objective:  General: patient appears well in no acute distress, alert and oriented, speech clear and fluid. Tearful at times.  Skin: no visualized rash or lesions on visualized skin  Resp: Appears to be breathing comfortably without accessory muscle usage, speaking in full sentences, no audible wheezes or cough.  Psych: Coherent speech, normal rate and volume, able to articulate logical thoughts, able to abstract reason, no tangential thoughts, no hallucinations or delusions  Patient's affect is appropriate.    Laboratory Data:  Most Recent 3 CBC's:  Recent Labs   Lab Test 01/02/24  1027 06/22/23  1306   WBC 5.7 4.7   HGB 13.1 11.8   MCV 89 87    159   ANEUTAUTO 4.2 2.9    Most Recent 3 BMP's:  Recent Labs   Lab Test 01/02/24  1027 06/22/23  1306 04/14/16  1354    139  --    POTASSIUM 3.9 3.6  --    CHLORIDE 106 105  --    CO2 28 28  --    BUN 11.6 13.8  --    CR 0.73 0.76 0.90   ANIONGAP 7 6*  --    JAIME 9.3 9.0  --    * 116*  --    PROTTOTAL 6.5 5.8*  --    ALBUMIN 4.0 3.6  --     Most Recent 2 LFT's:  Recent Labs   Lab Test 01/02/24  1027 06/22/23  1306   AST 16 17   ALT 16 11   ALKPHOS 59 60   BILITOTAL 0.8 0.6   I reviewed the above labs today.    Imaging:  MR Abdomen w/o & w Contrast  Narrative: Exam: MR ABDOMEN W/O & W CONTRAST, 1/2/2024 2:27 PM    Indication: 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)    Comparison: MRI 7/14/2023    Technique: Images were acquired with and without intravenous contrast  through the abdomen.  The following MR images were acquired: TrueFISP,  multiplanar T2 weighted, axial T1 in/out of phase, axial fat-saturated  T1, diffusion-weighted. Multiplanar T1-weighted images with fat  saturation were before contrast administration and at multiple time  points following the administration of intravenous contrast. Contrast  dose: 20 mL EOVIST    FINDINGS:    Liver: Normal hepatic morphology. Diffuse loss of signal on in phase  imaging, suggestive of iron deposition. No suspicious arterial  enhancing liver lesions or findings to suggest metastatic disease.    Gallbladder/Bile ducts: Normal    Spleen: Normal    Pancreas: Normal. No pancreatic ductal dilatation.    Adrenal glands: Unchanged 1.5 cm left lipid rich adenoma. The right  adrenal gland is unremarkable.    Kidneys: Unchanged subcentimeter cystic lesions with thin internal  enhancing septations and associated restricted diffusion in the  posterior left upper pole on series 13 image 42 and in the lateral  aspect of the superior pole the right kidney on series 13 image 46.    Bowel: No bowel obstruction.    Lymph nodes: No suspicious lymphadenopathy    Blood vessels: Patent vasculature    Lung bases: The lung bases are clear. Left basilar linear  fibroatelectasis.    Bones and soft tissues: No suspicious or aggressive appearing bone  lesions. Degenerative changes of the spine.    Mesentery and abdominal wall: Normal    Ascites: None  Impression: IMPRESSION:   1. No evidence of metastatic disease.  2. Unchanged indeterminant bilateral renal lesions with restricted  diffusion and thin internal enhancing septations. Recommend follow-up  MRI in 6 months.  3. Unchanged lipid rich left adrenal adenoma.       I have personally reviewed the examination and initial interpretation  and I agree with the findings.    SAGRARIO BURCH MD         SYSTEM ID:  [id number]  CT Chest w/o Contrast  Narrative: Examination: CT CHEST W/O CONTRAST, 1/2/2024 10:29 AM     History:  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)    Comparison: 6/22/2023    TECHNIQUE: Helical acquisition of CT images from the lung apices to  the kidneys without IV contrast. Coronal and axial MIP images were  reconstructed from the source data.    FINDINGS:     Lungs/Pleura:  No pleural effusion or pneumothorax. No focal consolidation. Stable  linear atelectasis/scarring in the lateral left lower lobe. No  suspicious new or enlarging pulmonary nodule. Calcified granulomas in  the anterior right upper and middle lobes. The central  tracheobronchial tree is clear. No bronchial wall thickening or  bronchiectasis.    Chest:   Normal heart size. No pericardial effusion. Mild coronary artery  calcifications. Normal caliber ascending aorta and main pulmonary  artery. No thoracic adenopathy.    Upper abdomen: Limited evaluation of the upper abdomen. Stable 1.7 cm  left adrenal adenoma. Please for to same day abdominal MRI for further  details.    Bones and soft tissues: No acute or aggressive osseus abnormality.  Impression: IMPRESSION: No evidence of metastatic disease in the chest.    KRISHNA RUTHERFORD DO       SYSTEM ID:  [id number]    I reviewed the above imaging reports today.    Assessment and Plan:  Choroidal melanoma, left eye, cT2 N0 M0, Stage IIA  It was a pleasure to meet Ms. Ledezma. She is 58 year old woman with medium sized choroidal melanoma of the left eye, s/p gammaknife in July 2023.  Her recent imaging, as above shows no evidence of metastatic disease, which was reviewed with the patient today. She will have imaging with a chest CT and abdominal MRI and visits every 6 month based on the size of the tumor.      Bilateral renal cysts  Stable. Will continue to monitor.      Health care maintenance.  Eye exams: Recommend exams at least every 2 years. Up to date  Dental exams: Recommend exams and cleanings every 6 months. Up to date   Primary care: Recommend follow up at least annually. Up to date  Skin care:  Recommend the use of sunscreen with SPF of at least 30, reapplying every 2 hours with prolonged sun exposure.  Tobacco use: Recommend abstaining. Smokes pot, not cigarettes. Quit smoking cigarettes late 2020.   Alcohol use: Recommend no more than 1/day for women and 2/day for men. Denies use.   Physical activity: Recommend regular activity, ideally 150 minutes/week of moderate intensity activity. Walks 30 minutes/day 5-6 days/week at the gym for the past year.     Kimberly Putnam PA-C  [facility]  [address] [place] [id number]  [phone]    22 minutes spent on the date of the encounter doing chart review, review of test results, interpretation of tests, patient visit, and documentation

## 2024-01-04 NOTE — NURSING NOTE
Is the patient currently in the state of [place]? YES    Visit mode:TELEPHONE    If the visit is dropped, the patient can be reconnected by: VIDEO VISIT: Text to cell phone:   Telephone Information:   Mobile [phone]       Will anyone else be joining the visit? NO  (If patient encounters technical issues they should call [phone]    How would you like to obtain your AVS? MyChart    Are changes needed to the allergy or medication list? No    Reason for visit: IRISH MEZA

## 2024-01-04 NOTE — PROGRESS NOTES
Virtual Visit Details    Type of service:  Video Visit   Video Start Time: 8:58 AM  Video End Time:9:09 AM    Originating Location (pt. Location): Home  Distant Location (provider location):  On-site  Platform used for Video Visit: [facility]    Oncology/Hematology Visit Note  Jan 4, 2024    Reason for Visit: follow up of choroidal melanoma, left eye, review imaging    History of Present Illness:   Porsha Ledezma is a 58 year old female with choroidal melanoma of the left eye.     Several years ago was told she had a shadow in the eye.  May 2021; pt had eye exam, this showed a nevus left eye   May 2023; return eye exam, nevus left eye increased in size   6/12/23; pt saw Dr. Corey in consult. OCT shows elevated lesion with SRF, and juxtafoveal location. B-scan ultrasound showed elevated lesion no extension, + choroidal excavation Size: 4.17mm x 11.58 mmT x 10.33 mmL. Presented options, Dr. Corey favoring Gamma Knife due to juxtapapillary location   6/22/23; CT CAP shows indeterminate 2 cm left adrenal nodule, indeterminate bilateral renal masses measuring up to 1 cm (MRI Abd recommended).  No other evidence of metastasis.  7/5/23; Pelvic ultrasound shows 2.9 cm left ovarian cyst without solid components or septations. No follow up required per SRU guidelines.  7/17/23; completed gammaknife to left eye    Interval History:  -Feeling well overall.  -Will be moving out of her home and has stress with this.   -Father passed away last year.   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   -Vision is a little worse. Has occasional white flashes across the top of her eye. Also, notes some increase in dots in vision.  -Has a therapist that she sees on as needed basis. Also, feels supported by family and friends.       Current Outpatient Medications   Medication Sig Dispense Refill    amLODIPine (NORVASC) 5 MG tablet Take 1 tablet (5 mg) by mouth every morning 90 tablet 3    multivitamin w/minerals (MULTI-VITAMIN) tablet Take 1  tablet by mouth every morning      oxyBUTYnin (DITROPAN) 5 MG tablet Take 1 tablet (5 mg) by mouth 2 times daily 180 tablet 3    vilazodone (VIIBRYD) 40 MG TABS tablet Take 40 mg by mouth every morning       Objective:  General: patient appears well in no acute distress, alert and oriented, speech clear and fluid. Tearful at times.  Skin: no visualized rash or lesions on visualized skin  Resp: Appears to be breathing comfortably without accessory muscle usage, speaking in full sentences, no audible wheezes or cough.  Psych: Coherent speech, normal rate and volume, able to articulate logical thoughts, able to abstract reason, no tangential thoughts, no hallucinations or delusions  Patient's affect is appropriate.    Laboratory Data:  Most Recent 3 CBC's:  Recent Labs   Lab Test 01/02/24  1027 06/22/23  1306   WBC 5.7 4.7   HGB 13.1 11.8   MCV 89 87    159   ANEUTAUTO 4.2 2.9    Most Recent 3 BMP's:  Recent Labs   Lab Test 01/02/24  1027 06/22/23  1306 04/14/16  1354    139  --    POTASSIUM 3.9 3.6  --    CHLORIDE 106 105  --    CO2 28 28  --    BUN 11.6 13.8  --    CR 0.73 0.76 0.90   ANIONGAP 7 6*  --    JAIME 9.3 9.0  --    * 116*  --    PROTTOTAL 6.5 5.8*  --    ALBUMIN 4.0 3.6  --     Most Recent 2 LFT's:  Recent Labs   Lab Test 01/02/24  1027 06/22/23  1306   AST 16 17   ALT 16 11   ALKPHOS 59 60   BILITOTAL 0.8 0.6   I reviewed the above labs today.    Imaging:  MR Abdomen w/o & w Contrast  Narrative: Exam: MR ABDOMEN W/O & W CONTRAST, 1/2/2024 2:27 PM    Indication: 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)    Comparison: MRI 7/14/2023    Technique: Images were acquired with and without intravenous contrast  through the abdomen. The following MR images were acquired: TrueFISP,  multiplanar T2 weighted, axial T1 in/out of phase, axial fat-saturated  T1, diffusion-weighted. Multiplanar T1-weighted images with fat  saturation were before contrast administration and at multiple time  points  following the administration of intravenous contrast. Contrast  dose: 20 mL EOVIST    FINDINGS:    Liver: Normal hepatic morphology. Diffuse loss of signal on in phase  imaging, suggestive of iron deposition. No suspicious arterial  enhancing liver lesions or findings to suggest metastatic disease.    Gallbladder/Bile ducts: Normal    Spleen: Normal    Pancreas: Normal. No pancreatic ductal dilatation.    Adrenal glands: Unchanged 1.5 cm left lipid rich adenoma. The right  adrenal gland is unremarkable.    Kidneys: Unchanged subcentimeter cystic lesions with thin internal  enhancing septations and associated restricted diffusion in the  posterior left upper pole on series 13 image 42 and in the lateral  aspect of the superior pole the right kidney on series 13 image 46.    Bowel: No bowel obstruction.    Lymph nodes: No suspicious lymphadenopathy    Blood vessels: Patent vasculature    Lung bases: The lung bases are clear. Left basilar linear  fibroatelectasis.    Bones and soft tissues: No suspicious or aggressive appearing bone  lesions. Degenerative changes of the spine.    Mesentery and abdominal wall: Normal    Ascites: None  Impression: IMPRESSION:   1. No evidence of metastatic disease.  2. Unchanged indeterminant bilateral renal lesions with restricted  diffusion and thin internal enhancing septations. Recommend follow-up  MRI in 6 months.  3. Unchanged lipid rich left adrenal adenoma.       I have personally reviewed the examination and initial interpretation  and I agree with the findings.    SAGRARIO BURCH MD         SYSTEM ID:  [id number]  CT Chest w/o Contrast  Narrative: Examination: CT CHEST W/O CONTRAST, 1/2/2024 10:29 AM     History: 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)    Comparison: 6/22/2023    TECHNIQUE: Helical acquisition of CT images from the lung apices to  the kidneys without IV contrast. Coronal and axial MIP images were  reconstructed from the source data.    FINDINGS:      Lungs/Pleura:  No pleural effusion or pneumothorax. No focal consolidation. Stable  linear atelectasis/scarring in the lateral left lower lobe. No  suspicious new or enlarging pulmonary nodule. Calcified granulomas in  the anterior right upper and middle lobes. The central  tracheobronchial tree is clear. No bronchial wall thickening or  bronchiectasis.    Chest:   Normal heart size. No pericardial effusion. Mild coronary artery  calcifications. Normal caliber ascending aorta and main pulmonary  artery. No thoracic adenopathy.    Upper abdomen: Limited evaluation of the upper abdomen. Stable 1.7 cm  left adrenal adenoma. Please for to same day abdominal MRI for further  details.    Bones and soft tissues: No acute or aggressive osseus abnormality.  Impression: IMPRESSION: No evidence of metastatic disease in the chest.    KRISHNA RUTHERFORD,          SYSTEM ID:  [id number]    I reviewed the above imaging reports today.    Assessment and Plan:  Choroidal melanoma, left eye, cT2 N0 M0, Stage IIA  It was a pleasure to meet Ms. Ledezma. She is 58 year old woman with medium sized choroidal melanoma of the left eye, s/p gammaknife in July 2023.  Her recent imaging, as above shows no evidence of metastatic disease, which was reviewed with the patient today. She will have imaging with a chest CT and abdominal MRI and visits every 6 month based on the size of the tumor.      Bilateral renal cysts  Stable. Will continue to monitor.      Health care maintenance.  Eye exams: Recommend exams at least every 2 years. Up to date  Dental exams: Recommend exams and cleanings every 6 months. Up to date   Primary care: Recommend follow up at least annually. Up to date  Skin care: Recommend the use of sunscreen with SPF of at least 30, reapplying every 2 hours with prolonged sun exposure.  Tobacco use: Recommend abstaining. Smokes pot, not cigarettes. Quit smoking cigarettes late 2020.   Alcohol use: Recommend no more than 1/day for women  and 2/day for men. Denies use.   Physical activity: Recommend regular activity, ideally 150 minutes/week of moderate intensity activity. Walks 30 minutes/day 5-6 days/week at the gym for the past year.     Kimberly Putnam PA-C  [facility] [address] [address]  [phone]    22 minutes spent on the date of the encounter doing chart review, review of test results, interpretation of tests, patient visit, and documentation

## 2024-02-14 ENCOUNTER — NURSE TRIAGE (OUTPATIENT)
Dept: FAMILY MEDICINE | Facility: CLINIC | Age: 59
End: 2024-02-14

## 2024-02-14 NOTE — TELEPHONE ENCOUNTER
COVID Positive/Requesting COVID treatment    Patient is positive for COVID and requesting treatment options.    Date of positive COVID test (PCR or at home)? 2/14/24  Current COVID symptoms: congestion or runny nose  Date COVID symptoms began: 2/14/24    Message should be routed to clinic RN pool. Best phone number to use for call back: [phone]

## 2024-02-14 NOTE — TELEPHONE ENCOUNTER
RN COVID TREATMENT VISIT  02/14/24      The patient has been triaged and does not require a higher level of care.    Porsha Michelleton  58 year old  Current weight? 202 lbs    Has the patient been seen by a primary care provider at an [facility] or [facility] within the past two years? Yes.   Have you been in close proximity to/do you have a known exposure to a person with a confirmed case of influenza? No.     General treatment eligibility:  Date of positive COVID test (PCR or at home)?  2-    Are you or have you been hospitalized for this COVID-19 infection? No.   Have you received monoclonal antibodies or antiviral treatment for COVID-19 since this positive test? No.   Do you have any of the following conditions that place you at risk of being very sick from COVID-19?   - Age 50 years or older  - Heart conditions such as cardiomyopathies, congenital heart defects, coronary artery disease, heart arrhythmias, heart failure, hypertension, valve disorders   - Mental health disorders including mood disorders, depression, schizophrenia spectrum disorders   - Overweight or Obesity (BMI >85th percentile or BMI 25 or higher)  - Current or former smoker  Yes, patient has at least one high risk condition as noted above.     Current COVID symptoms:   - fever or chills  - congestion or runny nose  Yes. Patient has at least one symptom as selected.     How many days since symptoms started? 5 days or less. Established patient, 12 years or older weighing at least 88.2 lbs, who has symptoms that started in the past 5 days, has not been hospitalized nor received treatment already, and is at risk for being very sick from COVID-19.     Treatment eligibility by RN:  Are you currently pregnant or nursing? No  Do you have a clinically significant hypersensitivity to nirmatrelvir or ritonavir, or toxic epidermal necrolysis (TEN) or Cochran-Jay Syndrome? No  Do you have a history of hepatitis, any  hepatic impairment on the Problem List (such as Child-Barreto Class C, cirrhosis, fatty liver disease, alcoholic liver disease), or was the last liver lab (hepatic panel, ALT, AST, ALK Phos, bilirubin) elevated in the past 6 months? No  Do you have any history of severe renal impairment (eGFR < 30mL/min)? No    Is patient eligible to continue? Yes, patient meets all eligibility requirements for the RN COVID treatment (as denoted by all no responses above).     Current Outpatient Medications   Medication Sig Dispense Refill    amLODIPine (NORVASC) 5 MG tablet Take 1 tablet (5 mg) by mouth every morning 90 tablet 3    multivitamin w/minerals (MULTI-VITAMIN) tablet Take 1 tablet by mouth every morning      oxyBUTYnin (DITROPAN) 5 MG tablet Take 1 tablet (5 mg) by mouth 2 times daily 180 tablet 3    vilazodone (VIIBRYD) 40 MG TABS tablet Take 40 mg by mouth every morning         Medications from List 1 of the standing order (on medications that exclude the use of Paxlovid) that patient is taking: NONE. Is patient taking Sandeep's Wort? No  Is patient taking [place]'s Wort or any meds from List 1? No.   Medications from List 2 of the standing order (on meds that provider needs to adjust) that patient is taking: amlodipine (Norvasc), explained a provider visit is necessary to discuss medication adjustments while taking Paxlovid. Is patient on any of the meds from List 2? Yes. Patient will be scheduled or transferred to a  at the end of this call.   Nayla Vincent RN        Additional Information   Negative: SEVERE difficulty breathing (e.g., struggling for each breath, speaks in single words)   Negative: Difficult to awaken or acting confused (e.g., disoriented, slurred speech)   Negative: Bluish (or gray) lips or face now   Negative: Shock suspected (e.g., cold/pale/clammy skin, too weak to stand, low BP, rapid pulse)   Negative: Sounds like a life-threatening emergency to the triager   Negative: SEVERE  or constant chest pain or pressure  (Exception: Mild central chest pain, present only when coughing.)   Negative: MODERATE difficulty breathing (e.g., speaks in phrases, SOB even at rest, pulse 100-120)   Negative: Headache and stiff neck (can't touch chin to chest)   Negative: Oxygen level (e.g., pulse oximetry) 90% or lower   Negative: Chest pain or pressure  (Exception: MILD central chest pain, present only when coughing.)   Negative: Drinking very little and dehydration suspected (e.g., no urine > 12 hours, very dry mouth, very lightheaded)   Negative: Patient sounds very sick or weak to the triager    Protocols used: Coronavirus (COVID-19) Diagnosed or [id number]

## 2024-02-26 DIAGNOSIS — C69.32 MALIGNANT MELANOMA OF CHOROID OF LEFT EYE (H): Primary | ICD-10-CM

## 2024-03-11 ENCOUNTER — OFFICE VISIT (OUTPATIENT)
Dept: OPHTHALMOLOGY | Facility: CLINIC | Age: 59
End: 2024-03-11
Attending: OPHTHALMOLOGY
Payer: COMMERCIAL

## 2024-03-11 DIAGNOSIS — C69.32 MALIGNANT MELANOMA OF CHOROID OF LEFT EYE (H): ICD-10-CM

## 2024-03-11 DIAGNOSIS — H35.89 POST-RADIATION RETINOPATHY, INITIAL ENCOUNTER: Primary | ICD-10-CM

## 2024-03-11 DIAGNOSIS — T66.XXXA POST-RADIATION RETINOPATHY, INITIAL ENCOUNTER: Primary | ICD-10-CM

## 2024-03-11 PROCEDURE — 67028 INJECTION EYE DRUG: CPT | Mod: LT | Performed by: OPHTHALMOLOGY

## 2024-03-11 PROCEDURE — 92250 FUNDUS PHOTOGRAPHY W/I&R: CPT | Performed by: OPHTHALMOLOGY

## 2024-03-11 PROCEDURE — 99207 FUNDUS PHOTOS OU (BOTH EYES): CPT | Mod: 26 | Performed by: OPHTHALMOLOGY

## 2024-03-11 PROCEDURE — 250N000011 HC RX IP 250 OP 636: Performed by: OPHTHALMOLOGY

## 2024-03-11 PROCEDURE — G0463 HOSPITAL OUTPT CLINIC VISIT: HCPCS | Mod: 25 | Performed by: OPHTHALMOLOGY

## 2024-03-11 PROCEDURE — 76510 OPH US DX B-SCAN&QUAN A-SCAN: CPT | Performed by: OPHTHALMOLOGY

## 2024-03-11 PROCEDURE — 92134 CPTRZ OPH DX IMG PST SGM RTA: CPT | Performed by: OPHTHALMOLOGY

## 2024-03-11 PROCEDURE — 99214 OFFICE O/P EST MOD 30 MIN: CPT | Mod: 25 | Performed by: OPHTHALMOLOGY

## 2024-03-11 RX ADMIN — Medication 1.25 MG: at 11:17

## 2024-03-11 ASSESSMENT — VISUAL ACUITY
METHOD: SNELLEN - LINEAR
OD_CC: 20/15
OD_CC+: -1
OS_CC: 20/20
CORRECTION_TYPE: GLASSES

## 2024-03-11 ASSESSMENT — CUP TO DISC RATIO
OS_RATIO: 0.3
OD_RATIO: 0.3

## 2024-03-11 ASSESSMENT — REFRACTION_WEARINGRX
OD_AXIS: 173
OS_CYLINDER: +0.50
OD_CYLINDER: +0.25
OS_AXIS: 011
OD_SPHERE: -3.00
OS_SPHERE: -2.75

## 2024-03-11 ASSESSMENT — SLIT LAMP EXAM - LIDS
COMMENTS: NORMAL
COMMENTS: NORMAL

## 2024-03-11 ASSESSMENT — EXTERNAL EXAM - RIGHT EYE: OD_EXAM: NORMAL

## 2024-03-11 ASSESSMENT — TONOMETRY
OS_IOP_MMHG: 16
OD_IOP_MMHG: 18
IOP_METHOD: ICARE

## 2024-03-11 ASSESSMENT — EXTERNAL EXAM - LEFT EYE: OS_EXAM: NORMAL

## 2024-03-11 NOTE — PROGRESS NOTES
"CC -   CMM OS    INTERVAL HISTORY -   No new problems or concern    PMH -   Porsha Ledezma is a  58 year old year-old patient with history of CMM OS diagnosed 2023    Had eye exam in 5/2021 showing nevus OS, returned 5/2023 and lesion much larger  No vision symptoms  Recalls was told about \"shadow\" in eye in years past as well    No DM, no HTn, no prior CA    PAST OCULAR SURGERY  Gamma Knife OS 7/17/23        RETINAL IMAGING:  OCT 03/11/24   OD - macula normal, PHF attached  OS - mild SUBRETINAL FLUID SN macula, PHF attached   periphery - elevated lesion  with SRF, choroid abnormal juxtafoveal      U/S OS  A-scan - medium reflective  B-scan - elevated lesion no extension, +choroidal excavation,  size 4.17mm x 11.58T x 10.33L (06/12/23)->4.11 x 11.92T x 13.57L(10/27/23) -> 4.01 x 12.40T x 12.12L (3/2024)      ASSESSMENT & PLAN    # CMM left eye s/p Gamma Knife  7/17/2023    - substantial size growth 2021 to 2023 (images viewed on patient's phone, will upload to [facility])  - no Bx obtained     - last U/S 3/11/24   - stable on last  U/S    - clarus photos likely stable but ?subtle change anterior border c/t 10/2023   - OCT to monitor nasal macula    - next U/S in 4 month interval (~7/2024)     - plan Avastin to prevent radiation retinopathy   - patient willing to sign ABN   - start Avastin 11/17/23     - last injection Avastin 11/17/23   - inject Avastin today   - next injection 4 month interval      # radiation retinopathy OS   - new DBH noted 3/2024      Return in about 4 months (around 7/11/2024) for DFE OS, Clarus Photo, OCT OS, US OS, Injection OS, Avastin.           ATTESTATION     Attending Physician Attestation:      Complete documentation of historical and exam elements from today's encounter can be found in the full encounter summary report (not reduplicated in this progress note).  I personally obtained the chief complaint(s) and history of present illness.  I confirmed and edited as necessary the review of " systems, past medical/surgical history, family history, social history, and examination findings as documented by others; and I examined the patient myself.  I personally reviewed the relevant tests, images, and reports as documented above.  I formulated and edited as necessary the assessment and plan and discussed the findings and management plan with the patient and family    Madonna Corey MD, PhD  , Vitreoretinal Surgery  Department of Ophthalmology  [facility]

## 2024-03-11 NOTE — NURSING NOTE
Chief Complaints and History of Present Illnesses   Patient presents with    Follow Up     Chief Complaint(s) and History of Present Illness(es)       Follow Up              Laterality: both eyes    Course: stable    Associated symptoms: flashes and floaters.  Negative for eye pain    Treatments tried: no treatments              Comments    Here for follow up. VA is about the same. Occasional flashes and floaters or pain.     David Gonzales COT 9:15 AM March 11, 2024

## 2024-03-28 ENCOUNTER — TELEPHONE (OUTPATIENT)
Dept: OPHTHALMOLOGY | Facility: CLINIC | Age: 59
End: 2024-03-28
Payer: COMMERCIAL

## 2024-05-23 ENCOUNTER — MYC MEDICAL ADVICE (OUTPATIENT)
Dept: OPHTHALMOLOGY | Facility: CLINIC | Age: 59
End: 2024-05-23
Payer: COMMERCIAL

## 2024-06-17 NOTE — LETTER
2023         RE: Porsha Ledezma  [address]  [facility] [address]        Dear Colleague,    Thank you for referring your patient, Porsha Ledezma, to the [facility] RADIATION ONCOLOGY GAMMA KNIFE. Please see a copy of my visit note below.      Name: Porsha Ledezma  : 1965  Medical Record #:[id number]    Diagnosis:    Date of Treatment: 2023  Referring Physicians: Madonna Corey MD, Tumor Registry      GAMMA KNIFE PROCEDURE NOTE and TREATMENT SUMMARY    Treatment Summary:     Treatment Site Dose Modality Collimators Shots   Left Retina 22 Gy to 54% isodose Cobalt 60 4,8mm 15             DESCRIPTION OF PROCEDURE:  On 2023 the patient was induced under general anesthesia in the operating room.     The stereotactic head frame was placed by Dr Reynoso.    A retrobulbar block was performed by Dr. Corey. He also sutured the medial and lateral rectus muscles to the head frame in order to completely immobilize the eye. The eye was taped shut with Tegaderm.    She underwent a stealth MRI. These images were then sent to the [employer]out Gamma Knife computer system where the [employer]umaStream Gamma Knife IconTM Plan software was used to create a highly conformal dose distribution using the number and size collimators detailed above.     She was brought to the Gamma Knife suite at the [facility].      The patient was then treated on the Icon Le[place]ell Gamma Knife. Treatment involved 1 target.      TREATMENT:    The patient was brought to the Gamma Knife suite. The patient was identified by 2 methods. A timeout was performed to confirm the correct patient and correct procedure. The site was identified by an MRI image and treatment planning software, which defined the treatment area.     We evaluated the frame manually to ensure it was still secure.     The treatment was delivered using the Lesksell Gamma Knife IconTM without complication. The sutures  Peripheral IV    Performed by: Arely Ross MD  Authorized by: Arely Ross MD    Size:  22 G  Laterality:  Left  Location:  Hand  Site Prep:  Alcohol  Attempts:  1  Securement: Stabilization device, Tape and Transparent dressing       were clipped. The head frame was removed.      The patient was taken back to the recovery room where she was reversed from anesthesia.    The patient recovered and was discharged home in stable condition.    The patient tolerated the treatment well and had no complications.    FOLLOW-UP PLANS:  The Gamma Knife Nurse Coordinator will call the patient tomorrow for short-term follow up.  Written discharge instructions were given to the patient.     The patient will have a follow up imaging with Dr Corey.     Approved by:  Kia Delgado MD        Again, thank you for allowing me to participate in the care of your patient.        Sincerely,        Kia Delgado MD

## 2024-07-05 ENCOUNTER — LAB (OUTPATIENT)
Dept: LAB | Facility: CLINIC | Age: 59
End: 2024-07-05
Payer: COMMERCIAL

## 2024-07-05 ENCOUNTER — ANCILLARY PROCEDURE (OUTPATIENT)
Dept: MRI IMAGING | Facility: CLINIC | Age: 59
End: 2024-07-05
Attending: PHYSICIAN ASSISTANT
Payer: COMMERCIAL

## 2024-07-05 ENCOUNTER — ANCILLARY PROCEDURE (OUTPATIENT)
Dept: CT IMAGING | Facility: CLINIC | Age: 59
End: 2024-07-05
Attending: PHYSICIAN ASSISTANT
Payer: COMMERCIAL

## 2024-07-05 DIAGNOSIS — C69.32 MALIGNANT MELANOMA OF CHOROID OF LEFT EYE (H): ICD-10-CM

## 2024-07-05 LAB
ALBUMIN SERPL BCG-MCNC: 4.3 G/DL (ref 3.5–5.2)
ALP SERPL-CCNC: 64 U/L (ref 40–150)
ALT SERPL W P-5'-P-CCNC: 8 U/L (ref 0–50)
ANION GAP SERPL CALCULATED.3IONS-SCNC: 9 MMOL/L (ref 7–15)
AST SERPL W P-5'-P-CCNC: 19 U/L (ref 0–45)
BASOPHILS # BLD AUTO: 0.1 10E3/UL (ref 0–0.2)
BASOPHILS NFR BLD AUTO: 1 %
BILIRUB SERPL-MCNC: 0.7 MG/DL
BUN SERPL-MCNC: 10.9 MG/DL (ref 8–23)
CALCIUM SERPL-MCNC: 9.4 MG/DL (ref 8.8–10.2)
CHLORIDE SERPL-SCNC: 105 MMOL/L (ref 98–107)
CREAT SERPL-MCNC: 0.73 MG/DL (ref 0.51–0.95)
DEPRECATED HCO3 PLAS-SCNC: 27 MMOL/L (ref 22–29)
EGFRCR SERPLBLD CKD-EPI 2021: >90 ML/MIN/1.73M2
EOSINOPHIL # BLD AUTO: 0.1 10E3/UL (ref 0–0.7)
EOSINOPHIL NFR BLD AUTO: 3 %
ERYTHROCYTE [DISTWIDTH] IN BLOOD BY AUTOMATED COUNT: 13.2 % (ref 10–15)
GLUCOSE SERPL-MCNC: 91 MG/DL (ref 70–99)
HCT VFR BLD AUTO: 42.1 % (ref 35–47)
HGB BLD-MCNC: 14 G/DL (ref 11.7–15.7)
IMM GRANULOCYTES # BLD: 0 10E3/UL
IMM GRANULOCYTES NFR BLD: 0 %
LYMPHOCYTES # BLD AUTO: 1.2 10E3/UL (ref 0.8–5.3)
LYMPHOCYTES NFR BLD AUTO: 23 %
MCH RBC QN AUTO: 29.5 PG (ref 26.5–33)
MCHC RBC AUTO-ENTMCNC: 33.3 G/DL (ref 31.5–36.5)
MCV RBC AUTO: 89 FL (ref 78–100)
MONOCYTES # BLD AUTO: 0.5 10E3/UL (ref 0–1.3)
MONOCYTES NFR BLD AUTO: 9 %
NEUTROPHILS # BLD AUTO: 3.4 10E3/UL (ref 1.6–8.3)
NEUTROPHILS NFR BLD AUTO: 64 %
NRBC # BLD AUTO: 0 10E3/UL
NRBC BLD AUTO-RTO: 0 /100
PLATELET # BLD AUTO: 176 10E3/UL (ref 150–450)
POTASSIUM SERPL-SCNC: 4 MMOL/L (ref 3.4–5.3)
PROT SERPL-MCNC: 6.9 G/DL (ref 6.4–8.3)
RBC # BLD AUTO: 4.74 10E6/UL (ref 3.8–5.2)
SODIUM SERPL-SCNC: 141 MMOL/L (ref 135–145)
WBC # BLD AUTO: 5.3 10E3/UL (ref 4–11)

## 2024-07-05 PROCEDURE — 80053 COMPREHEN METABOLIC PANEL: CPT | Performed by: PATHOLOGY

## 2024-07-05 PROCEDURE — 36415 COLL VENOUS BLD VENIPUNCTURE: CPT | Performed by: PATHOLOGY

## 2024-07-05 PROCEDURE — 74183 MRI ABD W/O CNTR FLWD CNTR: CPT | Mod: GC | Performed by: RADIOLOGY

## 2024-07-05 PROCEDURE — A9581 GADOXETATE DISODIUM INJ: HCPCS | Performed by: RADIOLOGY

## 2024-07-05 PROCEDURE — 85025 COMPLETE CBC W/AUTO DIFF WBC: CPT | Performed by: PATHOLOGY

## 2024-07-05 PROCEDURE — 71250 CT THORAX DX C-: CPT | Mod: GC | Performed by: RADIOLOGY

## 2024-07-08 ENCOUNTER — VIRTUAL VISIT (OUTPATIENT)
Dept: ONCOLOGY | Facility: CLINIC | Age: 59
End: 2024-07-08
Attending: PHYSICIAN ASSISTANT
Payer: COMMERCIAL

## 2024-07-08 VITALS
SYSTOLIC BLOOD PRESSURE: 173 MMHG | DIASTOLIC BLOOD PRESSURE: 98 MMHG | BODY MASS INDEX: 32.49 KG/M2 | HEIGHT: 65 IN | WEIGHT: 195 LBS

## 2024-07-08 DIAGNOSIS — C69.32 MALIGNANT MELANOMA OF CHOROID OF LEFT EYE (H): Primary | ICD-10-CM

## 2024-07-08 DIAGNOSIS — N28.9 RENAL LESION: ICD-10-CM

## 2024-07-08 PROCEDURE — 99213 OFFICE O/P EST LOW 20 MIN: CPT | Mod: 95 | Performed by: PHYSICIAN ASSISTANT

## 2024-07-08 ASSESSMENT — PAIN SCALES - GENERAL: PAINLEVEL: NO PAIN (0)

## 2024-07-08 NOTE — PROGRESS NOTES
Virtual Visit Details    Type of service:  Video Visit   Video Start Time: 9:05 AM  Video End Time:9:22 AM    Originating Location (pt. Location): Home  Distant Location (provider location):  Off-site  Platform used for Video Visit: [facility]    Oncology/Hematology Visit Note  Jul 8, 2024    Reason for Visit: follow up of choroidal melanoma, left eye, review imaging    History of Present Illness:   Porsha Ledezma is a 59 year old female with choroidal melanoma of the left eye.     Several years ago was told she had a shadow in the eye.  May 2021; pt had eye exam, this showed a nevus left eye   May 2023; return eye exam, nevus left eye increased in size   6/12/23; pt saw Dr. Corey in consult. OCT shows elevated lesion with SRF, and juxtafoveal location. B-scan ultrasound showed elevated lesion no extension, + choroidal excavation Size: 4.17mm x 11.58 mmT x 10.33 mmL. Presented options, Dr. Corey favoring Gamma Knife due to juxtapapillary location   6/22/23; CT CAP shows indeterminate 2 cm left adrenal nodule, indeterminate bilateral renal masses measuring up to 1 cm (MRI Abd recommended).  No other evidence of metastasis.  7/5/23; Pelvic ultrasound shows 2.9 cm left ovarian cyst without solid components or septations. No follow up required per SRU guidelines.  7/17/23; completed gammaknife to left eye    Interval History:  -Doing well overall.   -Read a book about ultra processed foods and has been losing weight since cutting out those foods. Has cut out some bread and rice.  -Has lost about 10 pounds in the last couple of months.   -Going to the gym a little more as well. Now up to 50 minutes/day of exercise.   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  -Recently bought a convertible.   -Vision is slowly getting worse. Blind area is slowly getting bigger. Had annual follow-up with optometrist.  Tumor has been stable in size.   -Lost house and had to move, but likes new apartment.     Current Outpatient Medications    Medication Sig Dispense Refill    amLODIPine (NORVASC) 5 MG tablet Take 1 tablet (5 mg) by mouth every morning 90 tablet 3    multivitamin w/minerals (MULTI-VITAMIN) tablet Take 1 tablet by mouth every morning      oxyBUTYnin (DITROPAN) 5 MG tablet Take 1 tablet (5 mg) by mouth 2 times daily 180 tablet 3    vilazodone (VIIBRYD) 40 MG TABS tablet Take 40 mg by mouth every morning       Objective:  General: patient appears well in no acute distress, alert and oriented, speech clear and fluid.  Skin: no visualized rash or lesions on visualized skin  Resp: Appears to be breathing comfortably without accessory muscle usage, speaking in full sentences, no audible wheezes or cough.  Psych: Coherent speech, normal rate and volume, able to articulate logical thoughts, able to abstract reason, no tangential thoughts, no hallucinations or delusions  Patient's affect is appropriate.    Laboratory Data:  Most Recent 3 CBC's:  Recent Labs   Lab Test 07/05/24  1030 01/02/24  1027 06/22/23  1306   WBC 5.3 5.7 4.7   HGB 14.0 13.1 11.8   MCV 89 89 87    169 159   ANEUTAUTO 3.4 4.2 2.9    Most Recent 3 BMP's:  Recent Labs   Lab Test 07/05/24  1030 01/02/24  1027 06/22/23  1306    141 139   POTASSIUM 4.0 3.9 3.6   CHLORIDE 105 106 105   CO2 27 28 28   BUN 10.9 11.6 13.8   CR 0.73 0.73 0.76   ANIONGAP 9 7 6*   JAIME 9.4 9.3 9.0   GLC 91 106* 116*   PROTTOTAL 6.9 6.5 5.8*   ALBUMIN 4.3 4.0 3.6    Most Recent 2 LFT's:  Recent Labs   Lab Test 07/05/24  1030 01/02/24  1027   AST 19 16   ALT 8 16   ALKPHOS 64 59   BILITOTAL 0.7 0.8   I reviewed the above labs today.    Imaging:  MR Abdomen w/o & w Contrast  Narrative: MRI ABDOMEN    CLINICAL HISTORY:  History of malignant melanoma of choroid of left  eye    TECHNIQUE:  Images were acquired with and without intravenous contrast  through the abdomen. The following MR images were acquired: TrueFISP,  multiplanar T2 weighted, axial T1 in/out of phase, axial fat-saturated  T1,  diffusion-weighted. Multiplanar T1-weighted images with fat  saturation were before contrast administration and at multiple time  points following the administration of intravenous contrast. Contrast  dose: 18 ml eovist    FINDINGS:    Comparison study: MR 1/2/2024, 7/14/2023, CT 6/22/2023    Liver: Normal hepatic morphology. Mild signal loss on in phase imaging  consistent with mild iron deposition.. No suspicious arterial  enhancing lesions identified.    Gallbladder: No gallstones. No biliary dilatation.    Spleen: Normal.    Kidneys: Stable 1.1 cm lesion in the right superior pole shows  arterial enhancement, T2 hypointensity and restricted diffusion  (series 15, image 44). Stable 0.8 cm T2 hypointense lesion in the left  upper pole posterior aspect with questionable thin internal enhancing  septation (series 26, image 17).    Adrenal glands: The right adrenal gland is unremarkable. Stable 1.5 cm  left nodule with lipid rich internal contents. No suspicious masses.    Pancreas: Unremarkable. No pancreatic duct dilation.    Bowel: No bowel obstruction.    Lymph nodes: No lymphadenopathy.    Blood vessels: Patent vasculature.    Lung bases: Clear. Similar linear fibroatelectasis at the left lung  base.    Bones and soft tissues: No suspicious lesions.    Mesentery and abdominal wall: Unremarkable.    Ascites: None.  Impression: IMPRESSION:  1. No evidence of metastatic disease in the abdomen.  2. Stable 1.1 cm lesion in the right superior pole shows arterial  enhancement and restricted diffusion, concerning for neoplasm. Stable  0.8 cm T2 hypointense lesion in the left superior pole. Both lesions  are suspicious for neoplasms, possibly papillary type given T2  hypointensity with differential diagnosis of lipid poor  angiomyolipoma. Continued attention on follow-up is recommended.  3. Stable lipid rich left adrenal adenoma.    I have personally reviewed the examination and initial interpretation  and I agree with  the findings.    RJ CARRANZA MD         SYSTEM ID:  [id number]  CT Chest Low Dose Non Contrast  Narrative: EXAMINATION: Chest CT  7/5/2024 9:00 AM    CLINICAL HISTORY: choroidal melanoma of the left eye; Malignant  melanoma of choroid of left eye (H)    COMPARISON: 12/2/2024, 6/22/2024 CT    TECHNIQUE: CT imaging obtained through the chest without contrast.  Axial, coronal, and sagittal reconstructions and axial MIP reformatted  images are reviewed.     FINDINGS:    Devices: None.    Airways: The central tracheobronchial tree is clear.    Lungs: The lungs are clear. Left basilar subsegmental atelectasis. No  suspicious pulmonary nodule or mass. No pleural effusion or  pneumothorax    Lower neck and axillae: No enlarged supraclavicular nodes are present.  No actionable nodule is present in the imaged portion of the thyroid  lobes. No axillary lymphadenopathy.    Heart: Normal heart size. No pericardial fluid or thickening.    Mediastinum and emanuel: No mediastinal mass is present. No enlarged  lymph nodes are present.    Vessels: The ascending aorta measures 4.1 cm. Normal caliber main  pulmonary artery. Scattered atherosclerotic calcifications of the  aortic arch and coronary arteries.    Upper abdomen: No pathologic process is present in the imaged portion  of the upper abdomen. Stable partially visualized left adrenal adenoma  measuring up to 1.6 cm.    Bones: No acute or aggressive osseous abnormality. Multilevel  degenerative changes throughout the visualized spine.    Soft tissues: Unremarkable.  Impression: IMPRESSION:   1. No evidence of metastatic disease in the chest.  2. Ectatic ascending aorta measuring 4.1 cm.    I have personally reviewed the examination and initial interpretation  and I agree with the findings.    JULIANNE COOL MD         SYSTEM ID:  [id number]    I reviewed the above imaging reports today.    Assessment and Plan:  Choroidal melanoma, left eye, cT2 N0 M0, Stage IIA  It was a pleasure to  meet Ms. Ledezma. She is 59 year old woman with medium sized choroidal melanoma of the left eye, s/p gammaknife in July 2023.  Her recent imaging, as above shows no evidence of metastatic disease, which was reviewed with the patient today. She will have imaging with a chest CT and abdominal MRI and visits every 6 month based on the size of the tumor.      Bilateral renal lesions  Stable, though now described by radiology as concerning for neoplasm. Will refer her to see urology to discuss further.      Health care maintenance.  Eye exams: Recommend exams at least every 2 years. Up to date  Dental exams: Recommend exams and cleanings every 6 months. Will schedule  Primary care: Recommend follow up at least annually. Will schedule  Skin care: Recommend the use of sunscreen with SPF of at least 30, reapplying every 2 hours with prolonged sun exposure.  Tobacco use: Recommend abstaining. Smokes pot, not cigarettes. Quit smoking cigarettes late 2020.   Alcohol use: Recommend no more than 1/day for women and 2/day for men. Denies use. Quit prior to quitting cigarettes.  Physical activity: Recommend regular activity, ideally 150 minutes/week of moderate intensity activity. Walks 50 minutes/day 5-6 days/week at the gym.     Kimberly Putnam PA-C  [facility]  [address] [place] [address] [phone]

## 2024-07-08 NOTE — LETTER
7/8/2024      Porsha Ledezma  [address]  [facility] [address]      Dear Colleague,    Thank you for referring your patient, Porsha Ledezma, to the [facility]. Please see a copy of my visit note below.    Virtual Visit Details    Type of service:  Video Visit   Video Start Time: 9:05 AM  Video End Time:9:22 AM    Originating Location (pt. Location): Home  Distant Location (provider location):  Off-site  Platform used for Video Visit: [facility]    Oncology/Hematology Visit Note  Jul 8, 2024    Reason for Visit: follow up of choroidal melanoma, left eye, review imaging    History of Present Illness:   Porsha Ledezma is a 59 year old female with choroidal melanoma of the left eye.     Several years ago was told she had a shadow in the eye.  May 2021; pt had eye exam, this showed a nevus left eye   May 2023; return eye exam, nevus left eye increased in size   6/12/23; pt saw Dr. Corey in consult. OCT shows elevated lesion with SRF, and juxtafoveal location. B-scan ultrasound showed elevated lesion no extension, + choroidal excavation Size: 4.17mm x 11.58 mmT x 10.33 mmL. Presented options, Dr. Corey favoring Gamma Knife due to juxtapapillary location   6/22/23; CT CAP shows indeterminate 2 cm left adrenal nodule, indeterminate bilateral renal masses measuring up to 1 cm (MRI Abd recommended).  No other evidence of metastasis.  7/5/23; Pelvic ultrasound shows 2.9 cm left ovarian cyst without solid components or septations. No follow up required per SRU guidelines.  7/17/23; completed gammaknife to left eye    Interval History:  -Doing well overall.   -Read a book about ultra processed foods and has been losing weight since cutting out those foods. Has cut out some bread and rice.  -Has lost about 10 pounds in the last couple of months.   -Going to the gym a little more as well. Now up to 50 minutes/day of exercise.   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  -Recently bought  a convertible.   -Vision is slowly getting worse. Blind area is slowly getting bigger. Had annual follow-up with optometrist.  Tumor has been stable in size.   -Lost house and had to move, but likes new apartment.     Current Outpatient Medications   Medication Sig Dispense Refill     amLODIPine (NORVASC) 5 MG tablet Take 1 tablet (5 mg) by mouth every morning 90 tablet 3     multivitamin w/minerals (MULTI-VITAMIN) tablet Take 1 tablet by mouth every morning       oxyBUTYnin (DITROPAN) 5 MG tablet Take 1 tablet (5 mg) by mouth 2 times daily 180 tablet 3     vilazodone (VIIBRYD) 40 MG TABS tablet Take 40 mg by mouth every morning       Objective:  General: patient appears well in no acute distress, alert and oriented, speech clear and fluid.  Skin: no visualized rash or lesions on visualized skin  Resp: Appears to be breathing comfortably without accessory muscle usage, speaking in full sentences, no audible wheezes or cough.  Psych: Coherent speech, normal rate and volume, able to articulate logical thoughts, able to abstract reason, no tangential thoughts, no hallucinations or delusions  Patient's affect is appropriate.    Laboratory Data:  Most Recent 3 CBC's:  Recent Labs   Lab Test 07/05/24  1030 01/02/24  1027 06/22/23  1306   WBC 5.3 5.7 4.7   HGB 14.0 13.1 11.8   MCV 89 89 87    169 159   ANEUTAUTO 3.4 4.2 2.9    Most Recent 3 BMP's:  Recent Labs   Lab Test 07/05/24  1030 01/02/24  1027 06/22/23  1306    141 139   POTASSIUM 4.0 3.9 3.6   CHLORIDE 105 106 105   CO2 27 28 28   BUN 10.9 11.6 13.8   CR 0.73 0.73 0.76   ANIONGAP 9 7 6*   JAIME 9.4 9.3 9.0   GLC 91 106* 116*   PROTTOTAL 6.9 6.5 5.8*   ALBUMIN 4.3 4.0 3.6    Most Recent 2 LFT's:  Recent Labs   Lab Test 07/05/24  1030 01/02/24  1027   AST 19 16   ALT 8 16   ALKPHOS 64 59   BILITOTAL 0.7 0.8   I reviewed the above labs today.    Imaging:  MR Abdomen w/o & w Contrast  Narrative: MRI ABDOMEN    CLINICAL HISTORY:  History of malignant melanoma  of choroid of left  eye    TECHNIQUE:  Images were acquired with and without intravenous contrast  through the abdomen. The following MR images were acquired: TrueFISP,  multiplanar T2 weighted, axial T1 in/out of phase, axial fat-saturated  T1, diffusion-weighted. Multiplanar T1-weighted images with fat  saturation were before contrast administration and at multiple time  points following the administration of intravenous contrast. Contrast  dose: 18 ml eovist    FINDINGS:    Comparison study: MR 1/2/2024, 7/14/2023, CT 6/22/2023    Liver: Normal hepatic morphology. Mild signal loss on in phase imaging  consistent with mild iron deposition.. No suspicious arterial  enhancing lesions identified.    Gallbladder: No gallstones. No biliary dilatation.    Spleen: Normal.    Kidneys: Stable 1.1 cm lesion in the right superior pole shows  arterial enhancement, T2 hypointensity and restricted diffusion  (series 15, image 44). Stable 0.8 cm T2 hypointense lesion in the left  upper pole posterior aspect with questionable thin internal enhancing  septation (series 26, image 17).    Adrenal glands: The right adrenal gland is unremarkable. Stable 1.5 cm  left nodule with lipid rich internal contents. No suspicious masses.    Pancreas: Unremarkable. No pancreatic duct dilation.    Bowel: No bowel obstruction.    Lymph nodes: No lymphadenopathy.    Blood vessels: Patent vasculature.    Lung bases: Clear. Similar linear fibroatelectasis at the left lung  base.    Bones and soft tissues: No suspicious lesions.    Mesentery and abdominal wall: Unremarkable.    Ascites: None.  Impression: IMPRESSION:  1. No evidence of metastatic disease in the abdomen.  2. Stable 1.1 cm lesion in the right superior pole shows arterial  enhancement and restricted diffusion, concerning for neoplasm. Stable  0.8 cm T2 hypointense lesion in the left superior pole. Both lesions  are suspicious for neoplasms, possibly papillary type given  T2  hypointensity with differential diagnosis of lipid poor  angiomyolipoma. Continued attention on follow-up is recommended.  3. Stable lipid rich left adrenal adenoma.    I have personally reviewed the examination and initial interpretation  and I agree with the findings.    RJ CARRANZA MD         SYSTEM ID:  [id number]  CT Chest Low Dose Non Contrast  Narrative: EXAMINATION: Chest CT  7/5/2024 9:00 AM    CLINICAL HISTORY: choroidal melanoma of the left eye; Malignant  melanoma of choroid of left eye (H)    COMPARISON: 12/2/2024, 6/22/2024 CT    TECHNIQUE: CT imaging obtained through the chest without contrast.  Axial, coronal, and sagittal reconstructions and axial MIP reformatted  images are reviewed.     FINDINGS:    Devices: None.    Airways: The central tracheobronchial tree is clear.    Lungs: The lungs are clear. Left basilar subsegmental atelectasis. No  suspicious pulmonary nodule or mass. No pleural effusion or  pneumothorax    Lower neck and axillae: No enlarged supraclavicular nodes are present.  No actionable nodule is present in the imaged portion of the thyroid  lobes. No axillary lymphadenopathy.    Heart: Normal heart size. No pericardial fluid or thickening.    Mediastinum and emanuel: No mediastinal mass is present. No enlarged  lymph nodes are present.    Vessels: The ascending aorta measures 4.1 cm. Normal caliber main  pulmonary artery. Scattered atherosclerotic calcifications of the  aortic arch and coronary arteries.    Upper abdomen: No pathologic process is present in the imaged portion  of the upper abdomen. Stable partially visualized left adrenal adenoma  measuring up to 1.6 cm.    Bones: No acute or aggressive osseous abnormality. Multilevel  degenerative changes throughout the visualized spine.    Soft tissues: Unremarkable.  Impression: IMPRESSION:   1. No evidence of metastatic disease in the chest.  2. Ectatic ascending aorta measuring 4.1 cm.    I have personally reviewed the  examination and initial interpretation  and I agree with the findings.    JULIANNE COOL MD         SYSTEM ID:  [id number]    I reviewed the above imaging reports today.    Assessment and Plan:  Choroidal melanoma, left eye, cT2 N0 M0, Stage IIA  It was a pleasure to meet Ms. Ledezma. She is 59 year old woman with medium sized choroidal melanoma of the left eye, s/p gammaknife in July 2023.  Her recent imaging, as above shows no evidence of metastatic disease, which was reviewed with the patient today. She will have imaging with a chest CT and abdominal MRI and visits every 6 month based on the size of the tumor.      Bilateral renal lesions  Stable, though now described by radiology as concerning for neoplasm. Will refer her to see urology to discuss further.      Health care maintenance.  Eye exams: Recommend exams at least every 2 years. Up to date  Dental exams: Recommend exams and cleanings every 6 months. Will schedule  Primary care: Recommend follow up at least annually. Will schedule  Skin care: Recommend the use of sunscreen with SPF of at least 30, reapplying every 2 hours with prolonged sun exposure.  Tobacco use: Recommend abstaining. Smokes pot, not cigarettes. Quit smoking cigarettes late 2020.   Alcohol use: Recommend no more than 1/day for women and 2/day for men. Denies use. Quit prior to quitting cigarettes.  Physical activity: Recommend regular activity, ideally 150 minutes/week of moderate intensity activity. Walks 50 minutes/day 5-6 days/week at the gym.     Kimberly Putnam PA-C  [facility]  [address] [place] [id number]  [phone]    Again, thank you for allowing me to participate in the care of your patient.        Sincerely,        Kimberly Putnam PA-C

## 2024-07-08 NOTE — NURSING NOTE
Patient reviewed medications and allergies in Mychart during e-check in and said everything looked correct.      Current patient location: [address]   [facility] [address]    Is the patient currently in the state of [place]? YES    Visit mode:VIDEO    If the visit is dropped, the patient can be reconnected by: VIDEO VISIT: Text to cell phone:   Telephone Information:   Mobile [phone]       Will anyone else be joining the visit? NO  (If patient encounters technical issues they should call [phone] :[phone])    How would you like to obtain your AVS? MyChart    Are changes needed to the allergy or medication list? No    Are refills needed on medications prescribed by this physician? NO    Reason for visit: RECHECK (Wondering if the scans can be every year rather than every 6 months and wondering why they need to be every 6 months. Test results have some nodes and wondering if anything else is needing a follow up. Read the book Ultra Process[employer] book and have changed diet. )      Sunitha GORDONF

## 2024-07-10 ENCOUNTER — TELEPHONE (OUTPATIENT)
Dept: UROLOGY | Facility: CLINIC | Age: 59
End: 2024-07-10
Payer: COMMERCIAL

## 2024-07-10 NOTE — TELEPHONE ENCOUNTER
[facility] Call Center    Phone Message    May a detailed message be left on voicemail: yes or MyChart    Reason for Call: Appointment Intake  / Referral received  Referring Provider Name: Kimberly Putnam PA-C in  ONCOLOGY ADULT  Diagnosis and/or Symptoms: Renal lesion [N28.9]  My Clinical Question Is: history of choroidal melanoma, now with renal lesions     Spoke with patient to schedule urology referral however, she said that the clinic was to look over imaging first and then, contact her to schedule since, she also has eye cancer and it may have spread to her kidneys.  Per patient request, please review and contact her to schedule accordingly. She can be reached via Mychart too if that is easier for the clinic. Thank you!    Action Taken: Message routed to:  [facility] ([facility]    Travel Screening: Not Applicable     Date of Service:

## 2024-07-12 ENCOUNTER — PRE VISIT (OUTPATIENT)
Dept: UROLOGY | Facility: CLINIC | Age: 59
End: 2024-07-12
Payer: COMMERCIAL

## 2024-07-13 NOTE — TELEPHONE ENCOUNTER
Reason for visit: consult     Relevant information: renal lesion    Records/imaging/labs/orders: imaging available    Pt called: No need for a call    At Rooming: alem Chatman  7/12/2024  11:48 PM

## 2024-07-15 DIAGNOSIS — C69.32 MALIGNANT MELANOMA OF CHOROID OF LEFT EYE (H): Primary | ICD-10-CM

## 2024-07-29 ENCOUNTER — OFFICE VISIT (OUTPATIENT)
Dept: OPHTHALMOLOGY | Facility: CLINIC | Age: 59
End: 2024-07-29
Attending: OPHTHALMOLOGY
Payer: COMMERCIAL

## 2024-07-29 DIAGNOSIS — C69.32 MALIGNANT MELANOMA OF CHOROID OF LEFT EYE (H): ICD-10-CM

## 2024-07-29 PROCEDURE — 250N000011 HC RX IP 250 OP 636: Performed by: OPHTHALMOLOGY

## 2024-07-29 PROCEDURE — 67028 INJECTION EYE DRUG: CPT | Mod: LT | Performed by: OPHTHALMOLOGY

## 2024-07-29 PROCEDURE — 76510 OPH US DX B-SCAN&QUAN A-SCAN: CPT | Performed by: OPHTHALMOLOGY

## 2024-07-29 PROCEDURE — 92134 CPTRZ OPH DX IMG PST SGM RTA: CPT | Performed by: OPHTHALMOLOGY

## 2024-07-29 PROCEDURE — 99207 FUNDUS PHOTOS OS (LEFT EYE): CPT | Mod: 26 | Performed by: OPHTHALMOLOGY

## 2024-07-29 PROCEDURE — 92250 FUNDUS PHOTOGRAPHY W/I&R: CPT | Mod: 59 | Performed by: OPHTHALMOLOGY

## 2024-07-29 RX ADMIN — Medication 1.25 MG: at 09:43

## 2024-07-29 ASSESSMENT — TONOMETRY
OD_IOP_MMHG: 15
IOP_METHOD: TONOPEN
OS_IOP_MMHG: 14

## 2024-07-29 ASSESSMENT — CUP TO DISC RATIO: OS_RATIO: 0.3

## 2024-07-29 ASSESSMENT — VISUAL ACUITY
CORRECTION_TYPE: GLASSES
OD_CC: 20/20
METHOD: SNELLEN - LINEAR
OS_CC: 20/20

## 2024-07-29 ASSESSMENT — CONF VISUAL FIELD
OD_NORMAL: 1
OD_INFERIOR_NASAL_RESTRICTION: 0
OD_INFERIOR_TEMPORAL_RESTRICTION: 0
METHOD: COUNTING FINGERS
OS_INFERIOR_TEMPORAL_RESTRICTION: 3
OD_SUPERIOR_NASAL_RESTRICTION: 0
OD_SUPERIOR_TEMPORAL_RESTRICTION: 0

## 2024-07-29 ASSESSMENT — REFRACTION_WEARINGRX
OS_SPHERE: -2.75
OS_CYLINDER: +0.50
OD_CYLINDER: +0.25
OS_AXIS: 011
OD_AXIS: 173
OD_SPHERE: -3.00

## 2024-07-29 ASSESSMENT — EXTERNAL EXAM - LEFT EYE: OS_EXAM: NORMAL

## 2024-07-29 ASSESSMENT — PATIENT HEALTH QUESTIONNAIRE - PHQ9: SUM OF ALL RESPONSES TO PHQ QUESTIONS 1-9: 3

## 2024-07-29 ASSESSMENT — SLIT LAMP EXAM - LIDS: COMMENTS: NORMAL

## 2024-07-29 NOTE — NURSING NOTE
Chief Complaints and History of Present Illnesses   Patient presents with    Follow Up     4m follow up -  CMM left eye s/p Gamma Knife / Post-radiation retinopathy, initial encounter     Chief Complaint(s) and History of Present Illness(es)       Follow Up              Comments: 4m follow up -  CMM left eye s/p Gamma Knife / Post-radiation retinopathy, initial encounter              Comments    Pt stated vision is stable, no changes. Stable flashes and floaters. No eye pain, tearing, or discharge.     Angelic Cotton 8:42 AM  July 29, 2024

## 2024-07-29 NOTE — TELEPHONE ENCOUNTER
MEDICAL RECORDS REQUEST   [facility]  Urology Clinic  [address] [place] [address]  PHONE: [phone]  Fax: [phone]        FUTURE VISIT INFORMATION                                                   Porsha MIGNON Darien, : 1965 scheduled for future visit at [facility]    APPOINTMENT INFORMATION:  Date: 2024  Provider:  Melissa Esteves MD  Reason for Visit/Diagnosis: Renal lesion    REFERRAL INFORMATION:  Referring provider:  Kimberly Putnam PA-C in  ONCOLOGY ADULT      RECORDS REQUESTED FOR VISIT                                                     NOTES  STATUS/DETAILS   OFFICE NOTE from referring provider  yes, 2024 -- Kimberly Putnam PA-C in  ONCOLOGY ADULT   MEDICATION LIST  yes   IMAGES  yes, 2024, 2024, 2023 -- MR ABD  2024, 2024 -- CT CHEST     PRE-VISIT CHECKLIST      Joint diagnostic appointment coordinated correctly          (ensure right order & amount of time) Yes   RECORD COLLECTION COMPLETE Yes

## 2024-07-29 NOTE — PROGRESS NOTES
"CC -   CMM OS    INTERVAL HISTORY -  LV 3/2024, stable    PMH -   Porsha AIKEN Darien is a  59 year old year-old patient with history of CMM OS diagnosed 2023    Had eye exam in 5/2021 showing nevus OS, returned 5/2023 and lesion much larger  No vision symptoms  Recalls was told about \"shadow\" in eye in years past as well    No DM, no HTn, no prior CA    PAST OCULAR SURGERY  Gamma Knife OS 7/17/23        RETINAL IMAGING:  OCT 07/29/24   OD - (3/2024) macula normal, PHF attached  OS - mild SUBRETINAL FLUID SN macula, PHF attached   periphery - elevated lesion  with SRF, choroid abnormal juxtafoveal      U/S OS  A-scan - medium reflective  B-scan - elevated lesion no extension, +choroidal excavation,  size 4.17mm x 11.58T x 10.33L (06/12/23)->4.11 x 11.92T x 13.57L(10/27/23) -> 4.01 x 12.40T x 12.12L (3/2024) -> 4.00 x 12.04T x 12.05L(7/2024)      ASSESSMENT & PLAN    # CMM left eye s/p Gamma Knife  7/17/2023    - substantial size growth 2021 to 2023 (images viewed on patient's phone, will upload to [facility])  - no Bx obtained     - last U/S 7/11/24   - stable on last  U/S    - clarus photos likely stable but ?subtle change anterior border c/t 10/2023   - OCT to monitor nasal macula    - next U/S in  4 month interval (~11/2024)     - plan Avastin to prevent radiation retinopathy   - patient willing to sign ABN   - start Avastin 11/17/23     - last injection Avastin(#2) 3/2024    - inject Avastin today   - next injection 4 month interval      # radiation retinopathy OS   - new DBH noted 3/2024      Return in about 4 months (around 11/29/2024) for DFE OS, OCT OS, Optos, OS, US OS.           ATTESTATION     Attending Physician Attestation:      Complete documentation of historical and exam elements from today's encounter can be found in the full encounter summary report (not reduplicated in this progress note).  I personally obtained the chief complaint(s) and history of present illness.  I confirmed and edited as necessary the " review of systems, past medical/surgical history, family history, social history, and examination findings as documented by others; and I examined the patient myself.  I personally reviewed the relevant tests, images, and reports as documented above.  I formulated and edited as necessary the assessment and plan and discussed the findings and management plan with the patient and family    Madonna Corey MD, PhD  , Vitreoretinal Surgery  Department of Ophthalmology  [facility]

## 2024-07-31 ENCOUNTER — OFFICE VISIT (OUTPATIENT)
Dept: UROLOGY | Facility: CLINIC | Age: 59
End: 2024-07-31
Payer: COMMERCIAL

## 2024-07-31 ENCOUNTER — PRE VISIT (OUTPATIENT)
Dept: UROLOGY | Facility: CLINIC | Age: 59
End: 2024-07-31

## 2024-07-31 VITALS
HEIGHT: 65 IN | SYSTOLIC BLOOD PRESSURE: 144 MMHG | HEART RATE: 70 BPM | BODY MASS INDEX: 31.99 KG/M2 | WEIGHT: 192 LBS | DIASTOLIC BLOOD PRESSURE: 88 MMHG | OXYGEN SATURATION: 99 %

## 2024-07-31 DIAGNOSIS — N28.9 RENAL LESION: ICD-10-CM

## 2024-07-31 PROCEDURE — 99204 OFFICE O/P NEW MOD 45 MIN: CPT | Performed by: UROLOGY

## 2024-07-31 ASSESSMENT — PAIN SCALES - GENERAL: PAINLEVEL: NO PAIN (0)

## 2024-07-31 NOTE — PROGRESS NOTES
[facility]             Chief Complaint:   Right renal lesion           Consult or Referral:     Porsha Ledezma is a 59 year old female seen in consultation.         History of Present Illness:    Porsha Ledezma is a very pleasant 59 year old female with history of CMM left eye who was found to have a 1.1 cm enhancing lesion in the upper pole of the right kidney on 6/23/2023.  She has been having surveillance scans for the past year with no significant change in the size or shape of the lesion.     She denies any family history of kidney cancer.  She is a former smoker.  She denies any significant urinary issues.  She denies any gross hematuria or flank pain.         Past Medical History:     Past Medical History:   Diagnosis Date    ASCUS favor benign 04/01/2015    neg HPV. cotest in 3 years    Choroidal malignant melanoma, left (H)     Depression     Depressive disorder     Hypertension             Past Surgical History:     Past Surgical History:   Procedure Laterality Date    ANESTHESIA OUT OF OR MRI N/A 7/17/2023    Procedure: Anesthesia OUT OF THE OR Magnetic Resonance Imaging Brain;  Surgeon: GENERIC ANESTHESIA PROVIDER;  Location: [facility] OR    EXAM UNDER ANESTHESIA EYE(S) Left 7/17/2023    Procedure: Left Eye Immobilization @0730;  Surgeon: Madonna Corey MD;  Location: [facility] OR    PLACEMENT, HEAD FRAME N/A 7/17/2023    Procedure: Placement, Head Frame;  Surgeon: Kike Reynoso MD;  Location: [facility] OR    TOOTH EXTRACTION              Problem List:     Patient Active Problem List    Diagnosis Date Noted    Class 2 severe obesity due to excess calories with serious comorbidity in adult (H) 09/20/2023     Priority: Medium    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) 09/20/2023     Priority: Medium    CARDIOVASCULAR SCREENING; LDL GOAL LESS THAN 130 04/20/2014     Priority: Medium    Major depression in partial remission (H24) 04/20/2014     Priority: Medium     FOLLOwED BY  PSYCHIATRY ELSEWHERE      HTN, goal below 140/90 04/20/2014     Priority: Medium            Medications     Current Outpatient Medications   Medication Sig Dispense Refill    amLODIPine (NORVASC) 5 MG tablet Take 1 tablet (5 mg) by mouth every morning 90 tablet 3    multivitamin w/minerals (MULTI-VITAMIN) tablet Take 1 tablet by mouth every morning      oxyBUTYnin (DITROPAN) 5 MG tablet Take 1 tablet (5 mg) by mouth 2 times daily 180 tablet 3    vilazodone (VIIBRYD) 40 MG TABS tablet Take 40 mg by mouth every morning       Current Facility-Administered Medications   Medication Dose Route Frequency Provider Last Rate Last Admin    bevacizumab (AVASTIN) intravitreal inj 1.25 mg  1.25 mg Intravitreal Q28 Days Madonna Corey MD   1.25 mg at 07/29/24 0943     Facility-Administered Medications Ordered in Other Visits   Medication Dose Route Frequency Provider Last Rate Last Admin    gadobutrol (GADAVIST) injection 10 mL  10 mL Intravenous Once Sg Dinero MD                Family History:     Family History   Problem Relation Age of Onset    Heart Disease Mother     Alzheimer Disease Mother     Anxiety Disorder Mother     Heart Disease Father     Prostate Cancer Father         Removed, did not recur    Coronary Artery Disease Father     Brain Tumor Father         benign brain tumor removed    Breast Cancer Maternal Aunt     Breast Cancer Maternal Half-Sister     Anesthesia Reaction No family hx of     Deep Vein Thrombosis (DVT) No family hx of     Colon Cancer No family hx of             Social History:     Social History     Socioeconomic History    Marital status:      Spouse name: Not on file    Number of children: 0    Years of education: Not on file    Highest education level: Not on file   Occupational History    Occupation: pull tab    Tobacco Use    Smoking status: Former     Current packs/day: 0.00     Average packs/day: 1 pack/day for 10.0 years (10.0 ttl pk-yrs)     Types:  Cigarettes     Start date: 2010     Quit date: 2020     Years since quitting: 3.8     Passive exposure: Past    Smokeless tobacco: Never   Substance and Sexual Activity    Alcohol use: Not Currently     Comment: quit drinking 2020    Drug use: Yes     Types: Marijuana     Comment: Daily use    Sexual activity: Not Currently     Partners: Male     Birth control/protection: Post-menopausal   Other Topics Concern    Parent/sibling w/ CABG, MI or angioplasty before 65F 55M? Yes     Comment: Not sure when Mika (father) had triple bypass   Social History Narrative    Not on file     Social Determinants of Health     Financial Resource Strain: Not on File (7/15/2021)    Received from [facility]    Financial Resource Strain     Financial Resource Strain: 0   Food Insecurity: Not on File (7/15/2021)    Received from [facility]    Food Insecurity     Food: 0   Transportation Needs: Not on File (7/15/2021)    Received from [facility]    Transportation Needs     Transportation: 0   Physical Activity: Not on File (7/15/2021)    Received from [facility]    Physical Activity     Physical Activity: 0   Stress: Not on File (7/15/2021)    Received from [facility]    Stress     Stress: 0   Social Connections: Not on File (7/15/2021)    Received from [facility]    Social Connections     Social Connections and Isolation: 0   Interpersonal Safety: Low Risk  (2023)    Interpersonal Safety     Do you feel physically and emotionally safe where you currently live?: Yes     Within the past 12 months, have you been hit, slapped, kicked or otherwise physically hurt by someone?: No     Within the past 12 months, have you been humiliated or emotionally abused in other ways by your partner or ex-partner?: No   Housing Stability: Not on File (7/15/2021)    Received from [facility]    Housing Stability     Housin            Allergies:   Bee venom, Codeine, Morphine and codeine, and Penicillins         Review of  "Systems:  From intake questionnaire     Negative 14 system review except as noted on HPI, nurse's note see below.         Physical Exam:     Patient is a 59 year old  female Body mass index is 31.95 kg/m .  Constitutional: Vitals: Blood pressure (!) 144/88, pulse 70, height 1.651 m (5' 5\"), weight 87.1 kg (192 lb), SpO2 99%, not currently breastfeeding.  General Appearance Adult: Alert, no acute distress, oriented  HENT: throat/mouth:normal, good dentition  Neck: No adenopathy,masses or thyromegaly  Lungs/Repiratory: no respiratory distress, or pursed lip breathing  Heart: No obvious jugular venous distension present, normal heart rate  Abdomen: soft, nontender, no organomegaly or masses  Hematologic/Lymphatics: No cervical or supraclavicular adenopathy  Musculoskeltal: extremities normal, no peripheral edema  Skin: no noted suspicious lesions or rashes  Neuro: Alert, oriented, speech and mentation normal  Psych: affect and mood normal  Gait: Normal without assistance  : deferred          Labs and Pathology:    I personally reviewed all applicable laboratory and pathology data reviewed with patient    Lab Results   Component Value Date    WBC 5.3 07/05/2024     Lab Results   Component Value Date    RBC 4.74 07/05/2024     Lab Results   Component Value Date    HGB 14.0 07/05/2024     Lab Results   Component Value Date    HCT 42.1 07/05/2024     No components found for: \"MCT\"  Lab Results   Component Value Date    MCV 89 07/05/2024     Lab Results   Component Value Date    MCH 29.5 07/05/2024     Lab Results   Component Value Date    MCHC 33.3 07/05/2024     Lab Results   Component Value Date    RDW 13.2 07/05/2024     Lab Results   Component Value Date     07/05/2024       Last Comprehensive Metabolic Panel:  Sodium   Date Value Ref Range Status   07/05/2024 141 135 - 145 mmol/L Final   04/17/2014 141 133 - 144 mmol/L Final     Potassium   Date Value Ref Range Status   07/05/2024 4.0 3.4 - 5.3 mmol/L Final "   06/19/2014 4.5 3.4 - 5.3 mmol/L Final     Chloride   Date Value Ref Range Status   07/05/2024 105 98 - 107 mmol/L Final   04/17/2014 102 94 - 109 mmol/L Final     Carbon Dioxide   Date Value Ref Range Status   04/17/2014 29 20 - 32 mmol/L Final     Carbon Dioxide (CO2)   Date Value Ref Range Status   07/05/2024 27 22 - 29 mmol/L Final     Anion Gap   Date Value Ref Range Status   07/05/2024 9 7 - 15 mmol/L Final   04/17/2014 10.3 6 - 17 mmol/L Final     Glucose   Date Value Ref Range Status   07/05/2024 91 70 - 99 mg/dL Final   04/17/2014 74 60 - 99 mg/dL Final     Comment:     Fasting specimen     Urea Nitrogen   Date Value Ref Range Status   07/05/2024 10.9 8.0 - 23.0 mg/dL Final   04/17/2014 8 7 - 30 mg/dL Final     Creatinine   Date Value Ref Range Status   07/05/2024 0.73 0.51 - 0.95 mg/dL Final   04/14/2016 0.90 0.52 - 1.04 mg/dL Final     GFR Estimate   Date Value Ref Range Status   07/05/2024 >90 >60 mL/min/1.73m2 Final     Comment:     eGFR calculated using 2021 CKD-EPI equation.   04/14/2016 66 >60 mL/min/1.7m2 Final     Calcium   Date Value Ref Range Status   07/05/2024 9.4 8.8 - 10.2 mg/dL Final   04/17/2014 9.3 8.5 - 10.4 mg/dL Final     Bilirubin Total   Date Value Ref Range Status   07/05/2024 0.7 <=1.2 mg/dL Final     Alkaline Phosphatase   Date Value Ref Range Status   07/05/2024 64 40 - 150 U/L Final     ALT   Date Value Ref Range Status   07/05/2024 8 0 - 50 U/L Final     Comment:     Reference intervals for this test were updated on 6/12/2023 to more accurately reflect our healthy population. There may be differences in the flagging of prior results with similar values performed with this method. Interpretation of those prior results can be made in the context of the updated reference intervals.       AST   Date Value Ref Range Status   07/05/2024 19 0 - 45 U/L Final     Comment:     Reference intervals for this test were updated on 6/12/2023 to more accurately reflect our healthy population.  There may be differences in the flagging of prior results with similar values performed with this method. Interpretation of those prior results can be made in the context of the updated reference intervals.         Imaging:    I personally viewed all applicable imaging and interpreted them and went over the results with the patient.  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  She understands that there is 70 to 80% risk of renal cell carcinoma associated with this lesion.  However, given the fact that there is has not been any change in the size of the lesion in the past year means that this is a very slow-growing tumor and the could be safely managed with active surveillance.  She is scheduled to have every 6 months surveillance imaging with ophthalmology and I would recommend returning to clinic if there is any significant change in the size of the lesion.  She is agreeable.            Plan:  Continue active surveillance  Return to clinic as needed      45 total minutes spent on the date of the encounter including direct interaction with the patient, performing chart review, documentation and further activities as noted above      Melissa Esteves MD   Department of Urology   [facility]

## 2024-07-31 NOTE — NURSING NOTE
"Chief Complaint   Patient presents with    Consult     Here for renal lesion,wants to be proactive, and wants to establish care       Blood pressure (!) 144/88, pulse 70, height 1.651 m (5' 5\"), weight 87.1 kg (192 lb), SpO2 99%, not currently breastfeeding. Body mass index is 31.95 kg/m .    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)       Allergies   Allergen Reactions    Bee Venom     Codeine     Morphine And Codeine Hives    Penicillins        Current Outpatient Medications   Medication Sig Dispense Refill    amLODIPine (NORVASC) 5 MG tablet Take 1 tablet (5 mg) by mouth every morning 90 tablet 3    multivitamin w/minerals (MULTI-VITAMIN) tablet Take 1 tablet by mouth every morning      oxyBUTYnin (DITROPAN) 5 MG tablet Take 1 tablet (5 mg) by mouth 2 times daily 180 tablet 3    vilazodone (VIIBRYD) 40 MG TABS tablet Take 40 mg by mouth every morning         Social History     Tobacco Use    Smoking status: Former     Current packs/day: 0.00     Average packs/day: 1 pack/day for 10.0 years (10.0 ttl pk-yrs)     Types: Cigarettes     Start date: 9/8/2010     Quit date: 9/8/2020     Years since quitting: 3.8     Passive exposure: Past    Smokeless tobacco: Never   Substance Use Topics    Alcohol use: Not Currently     Comment: quit drinking 9/2020    Drug use: Yes     Types: Marijuana     Comment: Daily use       Flavio Mcdaniel  7/31/2024  11:44 AM     "

## 2024-07-31 NOTE — LETTER
7/31/2024       RE: Porsha Ledezma  [address]  [facility] [address]     Dear Colleague,    Thank you for referring your patient, Porsha Ledezma, to the [facility] [place] at [facility]. Please see a copy of my visit note below.    Urology Oncology Clinic             Chief Complaint:   Right renal lesion           Consult or Referral:     Porsha Ledezma is a 59 year old female seen in consultation.         History of Present Illness:    Porsha Ledezma is a very pleasant 59 year old female with history of CMM left eye who was found to have a 1.1 cm enhancing lesion in the upper pole of the right kidney on 6/23/2023.  She has been having surveillance scans for the past year with no significant change in the size or shape of the lesion.     She denies any family history of kidney cancer.  She is a former smoker.  She denies any significant urinary issues.  She denies any gross hematuria or flank pain.         Past Medical History:     Past Medical History:   Diagnosis Date     ASCUS favor benign 04/01/2015    neg HPV. cotest in 3 years     Choroidal malignant melanoma, left (H)      Depression      Depressive disorder      Hypertension             Past Surgical History:     Past Surgical History:   Procedure Laterality Date     ANESTHESIA OUT OF OR MRI N/A 7/17/2023    Procedure: Anesthesia OUT OF THE OR Magnetic Resonance Imaging Brain;  Surgeon: GENERIC ANESTHESIA PROVIDER;  Location: [facility] OR     EXAM UNDER ANESTHESIA EYE(S) Left 7/17/2023    Procedure: Left Eye Immobilization @0730;  Surgeon: Madonna Corey MD;  Location: [facility] OR     PLACEMENT, HEAD FRAME N/A 7/17/2023    Procedure: Placement, Head Frame;  Surgeon: Kike Reynoso MD;  Location: [facility] OR     TOOTH EXTRACTION              Problem List:     Patient Active Problem List    Diagnosis Date Noted     Class 2 severe obesity due to excess calories with  serious comorbidity in adult (H) 09/20/2023     Priority: Medium     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) 09/20/2023     Priority: Medium     CARDIOVASCULAR SCREENING; LDL GOAL LESS THAN 130 04/20/2014     Priority: Medium     Major depression in partial remission (H24) 04/20/2014     Priority: Medium     FOLLOwED BY PSYCHIATRY ELSEWHERE       HTN, goal below 140/90 04/20/2014     Priority: Medium            Medications     Current Outpatient Medications   Medication Sig Dispense Refill     amLODIPine (NORVASC) 5 MG tablet Take 1 tablet (5 mg) by mouth every morning 90 tablet 3     multivitamin w/minerals (MULTI-VITAMIN) tablet Take 1 tablet by mouth every morning       oxyBUTYnin (DITROPAN) 5 MG tablet Take 1 tablet (5 mg) by mouth 2 times daily 180 tablet 3     vilazodone (VIIBRYD) 40 MG TABS tablet Take 40 mg by mouth every morning       Current Facility-Administered Medications   Medication Dose Route Frequency Provider Last Rate Last Admin     bevacizumab (AVASTIN) intravitreal inj 1.25 mg  1.25 mg Intravitreal Q28 Days Madonna Corey MD   1.25 mg at 07/29/24 0943     Facility-Administered Medications Ordered in Other Visits   Medication Dose Route Frequency Provider Last Rate Last Admin     gadobutrol (GADAVIST) injection 10 mL  10 mL Intravenous Once Sg Dinero MD                Family History:     Family History   Problem Relation Age of Onset     Heart Disease Mother      Alzheimer Disease Mother      Anxiety Disorder Mother      Heart Disease Father      Prostate Cancer Father         Removed, did not recur     Coronary Artery Disease Father      Brain Tumor Father         benign brain tumor removed     Breast Cancer Maternal Aunt      Breast Cancer Maternal Half-Sister      Anesthesia Reaction No family hx of      Deep Vein Thrombosis (DVT) No family hx of      Colon Cancer No family hx of             Social History:     Social History     Socioeconomic History     Marital  status:      Spouse name: Not on file     Number of children: 0     Years of education: Not on file     Highest education level: Not on file   Occupational History     Occupation: pull tab    Tobacco Use     Smoking status: Former     Current packs/day: 0.00     Average packs/day: 1 pack/day for 10.0 years (10.0 ttl pk-yrs)     Types: Cigarettes     Start date: 9/8/2010     Quit date: 9/8/2020     Years since quitting: 3.8     Passive exposure: Past     Smokeless tobacco: Never   Substance and Sexual Activity     Alcohol use: Not Currently     Comment: quit drinking 9/2020     Drug use: Yes     Types: Marijuana     Comment: Daily use     Sexual activity: Not Currently     Partners: Male     Birth control/protection: Post-menopausal   Other Topics Concern     Parent/sibling w/ CABG, MI or angioplasty before 65F 55M? Yes     Comment: Not sure when Mika (father) had triple bypass   Social History Narrative     Not on file     Social Determinants of Health     Financial Resource Strain: Not on File (7/15/2021)    Received from [facility]    Financial Resource Strain      Financial Resource Strain: 0   Food Insecurity: Not on File (7/15/2021)    Received from [facility]    Food Insecurity      Food: 0   Transportation Needs: Not on File (7/15/2021)    Received from [facility]    Transportation Needs      Transportation: 0   Physical Activity: Not on File (7/15/2021)    Received from [facility]    Physical Activity      Physical Activity: 0   Stress: Not on File (7/15/2021)    Received from [facility]    Stress      Stress: 0   Social Connections: Not on File (7/15/2021)    Received from [facility]    Social Connections      Social Connections and Isolation: 0   Interpersonal Safety: Low Risk  (9/20/2023)    Interpersonal Safety      Do you feel physically and emotionally safe where you currently live?: Yes      Within the past 12 months, have you been hit, slapped, kicked or otherwise  "physically hurt by someone?: No      Within the past 12 months, have you been humiliated or emotionally abused in other ways by your partner or ex-partner?: No   Housing Stability: Not on File (7/15/2021)    Received from [facility]    Housing Stability      Housin            Allergies:   Bee venom, Codeine, Morphine and codeine, and Penicillins         Review of Systems:  From intake questionnaire     Negative 14 system review except as noted on HPI, nurse's note see below.         Physical Exam:     Patient is a 59 year old  female Body mass index is 31.95 kg/m .  Constitutional: Vitals: Blood pressure (!) 144/88, pulse 70, height 1.651 m (5' 5\"), weight 87.1 kg (192 lb), SpO2 99%, not currently breastfeeding.  General Appearance Adult: Alert, no acute distress, oriented  HENT: throat/mouth:normal, good dentition  Neck: No adenopathy,masses or thyromegaly  Lungs/Repiratory: no respiratory distress, or pursed lip breathing  Heart: No obvious jugular venous distension present, normal heart rate  Abdomen: soft, nontender, no organomegaly or masses  Hematologic/Lymphatics: No cervical or supraclavicular adenopathy  Musculoskeltal: extremities normal, no peripheral edema  Skin: no noted suspicious lesions or rashes  Neuro: Alert, oriented, speech and mentation normal  Psych: affect and mood normal  Gait: Normal without assistance  : deferred          Labs and Pathology:    I personally reviewed all applicable laboratory and pathology data reviewed with patient    Lab Results   Component Value Date    WBC 5.3 2024     Lab Results   Component Value Date    RBC 4.74 2024     Lab Results   Component Value Date    HGB 14.0 2024     Lab Results   Component Value Date    HCT 42.1 2024     No components found for: \"MCT\"  Lab Results   Component Value Date    MCV 89 2024     Lab Results   Component Value Date    MCH 29.5 2024     Lab Results   Component Value Date    MCHC 33.3 " 07/05/2024     Lab Results   Component Value Date    RDW 13.2 07/05/2024     Lab Results   Component Value Date     07/05/2024       Last Comprehensive Metabolic Panel:  Sodium   Date Value Ref Range Status   07/05/2024 141 135 - 145 mmol/L Final   04/17/2014 141 133 - 144 mmol/L Final     Potassium   Date Value Ref Range Status   07/05/2024 4.0 3.4 - 5.3 mmol/L Final   06/19/2014 4.5 3.4 - 5.3 mmol/L Final     Chloride   Date Value Ref Range Status   07/05/2024 105 98 - 107 mmol/L Final   04/17/2014 102 94 - 109 mmol/L Final     Carbon Dioxide   Date Value Ref Range Status   04/17/2014 29 20 - 32 mmol/L Final     Carbon Dioxide (CO2)   Date Value Ref Range Status   07/05/2024 27 22 - 29 mmol/L Final     Anion Gap   Date Value Ref Range Status   07/05/2024 9 7 - 15 mmol/L Final   04/17/2014 10.3 6 - 17 mmol/L Final     Glucose   Date Value Ref Range Status   07/05/2024 91 70 - 99 mg/dL Final   04/17/2014 74 60 - 99 mg/dL Final     Comment:     Fasting specimen     Urea Nitrogen   Date Value Ref Range Status   07/05/2024 10.9 8.0 - 23.0 mg/dL Final   04/17/2014 8 7 - 30 mg/dL Final     Creatinine   Date Value Ref Range Status   07/05/2024 0.73 0.51 - 0.95 mg/dL Final   04/14/2016 0.90 0.52 - 1.04 mg/dL Final     GFR Estimate   Date Value Ref Range Status   07/05/2024 >90 >60 mL/min/1.73m2 Final     Comment:     eGFR calculated using 2021 CKD-EPI equation.   04/14/2016 66 >60 mL/min/1.7m2 Final     Calcium   Date Value Ref Range Status   07/05/2024 9.4 8.8 - 10.2 mg/dL Final   04/17/2014 9.3 8.5 - 10.4 mg/dL Final     Bilirubin Total   Date Value Ref Range Status   07/05/2024 0.7 <=1.2 mg/dL Final     Alkaline Phosphatase   Date Value Ref Range Status   07/05/2024 64 40 - 150 U/L Final     ALT   Date Value Ref Range Status   07/05/2024 8 0 - 50 U/L Final     Comment:     Reference intervals for this test were updated on 6/12/2023 to more accurately reflect our healthy population. There may be differences in the  flagging of prior results with similar values performed with this method. Interpretation of those prior results can be made in the context of the updated reference intervals.       AST   Date Value Ref Range Status   07/05/2024 19 0 - 45 U/L Final     Comment:     Reference intervals for this test were updated on 6/12/2023 to more accurately reflect our healthy population. There may be differences in the flagging of prior results with similar values performed with this method. Interpretation of those prior results can be made in the context of the updated reference intervals.         Imaging:    I personally viewed all applicable imaging and interpreted them and went over the results with the patient.  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  She understands that there is 70 to 80% risk of renal cell carcinoma associated with this lesion.  However, given the fact that there is has not been any change in the size of the lesion in the past year means that this is a very slow-growing tumor and the could be safely managed with active surveillance.  She is scheduled to have every 6 months surveillance imaging with ophthalmology and I would recommend returning to clinic if there is any significant change in the size of the lesion.  She is agreeable.            Plan:  Continue active surveillance  Return to clinic as needed      45 total minutes spent on the date of the encounter including direct interaction with the patient, performing chart review, documentation and further activities as noted above      Melissa Esteves MD   Department of Urology   [facility]       Again, thank you for allowing me to participate in the care of your patient.       Sincerely,    Melissa Esteves MD

## 2024-09-23 ENCOUNTER — ORDERS ONLY (AUTO-RELEASED) (OUTPATIENT)
Dept: FAMILY MEDICINE | Facility: CLINIC | Age: 59
End: 2024-09-23

## 2024-09-23 ENCOUNTER — OFFICE VISIT (OUTPATIENT)
Dept: FAMILY MEDICINE | Facility: CLINIC | Age: 59
End: 2024-09-23
Payer: COMMERCIAL

## 2024-09-23 VITALS
WEIGHT: 195 LBS | SYSTOLIC BLOOD PRESSURE: 105 MMHG | RESPIRATION RATE: 20 BRPM | OXYGEN SATURATION: 100 % | TEMPERATURE: 98.4 F | DIASTOLIC BLOOD PRESSURE: 68 MMHG | BODY MASS INDEX: 33.29 KG/M2 | HEART RATE: 63 BPM | HEIGHT: 64 IN

## 2024-09-23 DIAGNOSIS — Z12.11 SCREEN FOR COLON CANCER: ICD-10-CM

## 2024-09-23 DIAGNOSIS — Z23 NEED FOR PNEUMOCOCCAL 20-VALENT CONJUGATE VACCINATION: ICD-10-CM

## 2024-09-23 DIAGNOSIS — Z23 NEED FOR COVID-19 VACCINE: ICD-10-CM

## 2024-09-23 DIAGNOSIS — Z12.4 CERVICAL CANCER SCREENING: ICD-10-CM

## 2024-09-23 DIAGNOSIS — Z12.31 VISIT FOR SCREENING MAMMOGRAM: ICD-10-CM

## 2024-09-23 DIAGNOSIS — L98.9 SKIN LESION: ICD-10-CM

## 2024-09-23 DIAGNOSIS — Z23 NEED FOR PROPHYLACTIC VACCINATION AND INOCULATION AGAINST INFLUENZA: ICD-10-CM

## 2024-09-23 DIAGNOSIS — I10 HYPERTENSION GOAL BP (BLOOD PRESSURE) < 130/80: ICD-10-CM

## 2024-09-23 DIAGNOSIS — R39.15 URINARY URGENCY: ICD-10-CM

## 2024-09-23 DIAGNOSIS — Z00.00 ROUTINE GENERAL MEDICAL EXAMINATION AT A HEALTH CARE FACILITY: Primary | ICD-10-CM

## 2024-09-23 PROBLEM — E66.812 CLASS 2 SEVERE OBESITY DUE TO EXCESS CALORIES WITH SERIOUS COMORBIDITY IN ADULT (H): Status: RESOLVED | Noted: 2023-09-20 | Resolved: 2024-09-23

## 2024-09-23 PROBLEM — E66.01 CLASS 2 SEVERE OBESITY DUE TO EXCESS CALORIES WITH SERIOUS COMORBIDITY IN ADULT (H): Status: RESOLVED | Noted: 2023-09-20 | Resolved: 2024-09-23

## 2024-09-23 LAB
ALBUMIN SERPL BCG-MCNC: 4.1 G/DL (ref 3.5–5.2)
ALP SERPL-CCNC: 69 U/L (ref 40–150)
ALT SERPL W P-5'-P-CCNC: 9 U/L (ref 0–50)
ANION GAP SERPL CALCULATED.3IONS-SCNC: 9 MMOL/L (ref 7–15)
AST SERPL W P-5'-P-CCNC: 20 U/L (ref 0–45)
BILIRUB SERPL-MCNC: 0.5 MG/DL
BUN SERPL-MCNC: 12.7 MG/DL (ref 8–23)
CALCIUM SERPL-MCNC: 9.6 MG/DL (ref 8.8–10.4)
CHLORIDE SERPL-SCNC: 103 MMOL/L (ref 98–107)
CHOLEST SERPL-MCNC: 283 MG/DL
CREAT SERPL-MCNC: 0.8 MG/DL (ref 0.51–0.95)
EGFRCR SERPLBLD CKD-EPI 2021: 83 ML/MIN/1.73M2
FASTING STATUS PATIENT QL REPORTED: NO
FASTING STATUS PATIENT QL REPORTED: NO
GLUCOSE SERPL-MCNC: 92 MG/DL (ref 70–99)
HCO3 SERPL-SCNC: 28 MMOL/L (ref 22–29)
HDLC SERPL-MCNC: 57 MG/DL
LDLC SERPL CALC-MCNC: 195 MG/DL
NONHDLC SERPL-MCNC: 226 MG/DL
POTASSIUM SERPL-SCNC: 4.1 MMOL/L (ref 3.4–5.3)
PROT SERPL-MCNC: 6.8 G/DL (ref 6.4–8.3)
SODIUM SERPL-SCNC: 140 MMOL/L (ref 135–145)
TRIGL SERPL-MCNC: 154 MG/DL

## 2024-09-23 PROCEDURE — 90480 ADMN SARSCOV2 VAC 1/ONLY CMP: CPT | Performed by: INTERNAL MEDICINE

## 2024-09-23 PROCEDURE — 90673 RIV3 VACCINE NO PRESERV IM: CPT | Performed by: INTERNAL MEDICINE

## 2024-09-23 PROCEDURE — 90677 PCV20 VACCINE IM: CPT | Performed by: INTERNAL MEDICINE

## 2024-09-23 PROCEDURE — 99396 PREV VISIT EST AGE 40-64: CPT | Mod: 25 | Performed by: INTERNAL MEDICINE

## 2024-09-23 PROCEDURE — 90471 IMMUNIZATION ADMIN: CPT | Performed by: INTERNAL MEDICINE

## 2024-09-23 PROCEDURE — 80053 COMPREHEN METABOLIC PANEL: CPT | Performed by: INTERNAL MEDICINE

## 2024-09-23 PROCEDURE — 91320 SARSCV2 VAC 30MCG TRS-SUC IM: CPT | Performed by: INTERNAL MEDICINE

## 2024-09-23 PROCEDURE — 36415 COLL VENOUS BLD VENIPUNCTURE: CPT | Performed by: INTERNAL MEDICINE

## 2024-09-23 PROCEDURE — 80061 LIPID PANEL: CPT | Performed by: INTERNAL MEDICINE

## 2024-09-23 PROCEDURE — 99214 OFFICE O/P EST MOD 30 MIN: CPT | Mod: 25 | Performed by: INTERNAL MEDICINE

## 2024-09-23 PROCEDURE — 90472 IMMUNIZATION ADMIN EACH ADD: CPT | Performed by: INTERNAL MEDICINE

## 2024-09-23 PROCEDURE — 87624 HPV HI-RISK TYP POOLED RSLT: CPT | Performed by: INTERNAL MEDICINE

## 2024-09-23 PROCEDURE — G0145 SCR C/V CYTO,THINLAYER,RESCR: HCPCS | Performed by: INTERNAL MEDICINE

## 2024-09-23 RX ORDER — OXYBUTYNIN CHLORIDE 5 MG/1
5 TABLET ORAL 2 TIMES DAILY
Qty: 180 TABLET | Refills: 3 | Status: SHIPPED | OUTPATIENT
Start: 2024-09-23

## 2024-09-23 RX ORDER — AMLODIPINE BESYLATE 5 MG/1
5 TABLET ORAL EVERY MORNING
Qty: 90 TABLET | Refills: 3 | Status: CANCELLED | OUTPATIENT
Start: 2024-09-23

## 2024-09-23 ASSESSMENT — PAIN SCALES - GENERAL: PAINLEVEL: NO PAIN (0)

## 2024-09-23 ASSESSMENT — PATIENT HEALTH QUESTIONNAIRE - PHQ9
10. IF YOU CHECKED OFF ANY PROBLEMS, HOW DIFFICULT HAVE THESE PROBLEMS MADE IT FOR YOU TO DO YOUR WORK, TAKE CARE OF THINGS AT HOME, OR GET ALONG WITH OTHER PEOPLE: NOT DIFFICULT AT ALL
SUM OF ALL RESPONSES TO PHQ QUESTIONS 1-9: 3
SUM OF ALL RESPONSES TO PHQ QUESTIONS 1-9: 3

## 2024-09-23 NOTE — PROGRESS NOTES
Preventive Care Visit  [facility]  Radhaolinda Francisco DO, Internal Medicine  Sep 23, 2024      Assessment & Plan     (Z00.00) Routine general medical examination at a health care facility  (primary encounter diagnosis)  Comment:   Plan: Lipid panel reflex to direct LDL Non-fasting,         Comprehensive metabolic panel (BMP + Alb, Alk         Phos, ALT, AST, Total. Bili, TP)  Mammo: 3/28/23 negative  Pap: ASCUS 4/17/14, HPV negative; Pap 3/28/22 HPV neg; done today  Colon: Cologuard ordered per patient preference  Immunizations:   Discussed healthy lifestyle and aging recommendations including regular exercise, adequate and regular sleep, 5+ fruits and veggies daily- has made MAJOR life changes- walking every day, cut out processed foods- doing a GREAT job!    (I10) Hypertension goal BP (blood pressure) < 130/80  Comment: BP way below goal- try stopping amlodipine 5 mg daily.  Check BP at home.    (R39.15) Urinary urgency  Comment: Oxybutynin works well.  Continue- discussed at some point we'll look for an alternative as she gets closer to 63, can discuss PFPT.  Plan: oxyBUTYnin (DITROPAN) 5 MG tablet    (Z12.31) Visit for screening mammogram  Comment:   Plan: MA Screening Bilateral w/ Domo    (Z12.11) Screen for colon cancer  Comment:   Plan: COLOGUARD([employer])    (Z12.4) Cervical cancer screening  Comment:   Plan: HPV and Gynecologic Cytology Panel -         Recommended Age 30 - 65 Years    (L98.9) Skin lesion  Comment: history of melanoma, has numerous moles and is interested in dermatology skin check.  Plan: Adult Dermatology  Referral    (Z23) Need for COVID-19 vaccine  Comment: Done    (Z23) Need for prophylactic vaccination and inoculation against influenza  Comment: Done    (Z23) Need for pneumococcal 20-valent conjugate vaccination  Comment: Done       Patient has been advised of split billing requirements and indicates understanding:  "Yes        BMI  Estimated body mass index is 33.21 kg/m  as calculated from the following:    Height as of this encounter: 1.632 m (5' 4.25\").    Weight as of this encounter: 88.5 kg (195 lb).       Counseling  Appropriate preventive services were addressed with this patient via screening, questionnaire, or discussion as appropriate for fall prevention, nutrition, physical activity, Tobacco-use cessation, social engagement, weight loss and cognition.  Checklist reviewing preventive services available has been given to the patient.  Reviewed patient's diet, addressing concerns and/or questions.   The patient was instructed to see the dentist every 6 months.       Patient Instructions   - Try stopping the amlodipine and see if your blood pressure stays below 130/80.  If not, then we'll restart the amlodipine.    Patient Education  Preventive Care Advice   This is general advice given by our system to help you stay healthy. However, your care team may have specific advice just for you. Please talk to your care team about your preventive care needs.  Nutrition  Eat 5 or more servings of fruits and vegetables each day.  Try wheat bread, brown rice and whole grain pasta (instead of white bread, rice, and pasta).  Get enough calcium and vitamin D. Check the label on foods and aim for 100% of the RDA (recommended daily allowance).  Lifestyle  Exercise at least 150 minutes each week  (30 minutes a day, 5 days a week).  Do muscle strengthening activities 2 days a week. These help control your weight and prevent disease.  No smoking.  Wear sunscreen to prevent skin cancer.  Have a dental exam and cleaning every 6 months.  Yearly exams  See your health care team every year to talk about:  Any changes in your health.  Any medicines your care team has prescribed.  Preventive care, family planning, and ways to prevent chronic diseases.  Shots (vaccines)   HPV shots (up to age 26), if you've never had them before.  Hepatitis B " shots (up to age 59), if you've never had them before.  COVID-19 shot: Get this shot when it's due.  Flu shot: Get a flu shot every year.  Tetanus shot: Get a tetanus shot every 10 years.  Pneumococcal, hepatitis A, and RSV shots: Ask your care team if you need these based on your risk.  Shingles shot (for age 50 and up)  General health tests  Diabetes screening:  Starting at age 35, Get screened for diabetes at least every 3 years.  If you are younger than age 35, ask your care team if you should be screened for diabetes.  Cholesterol test: At age 39, start having a cholesterol test every 5 years, or more often if advised.  Bone density scan (DEXA): At age 50, ask your care team if you should have this scan for osteoporosis (brittle bones).  Hepatitis C: Get tested at least once in your life.  STIs (sexually transmitted infections)  Before age 24: Ask your care team if you should be screened for STIs.  After age 24: Get screened for STIs if you're at risk. You are at risk for STIs (including HIV) if:  You are sexually active with more than one person.  You don't use condoms every time.  You or a partner was diagnosed with a sexually transmitted infection.  If you are at risk for HIV, ask about PrEP medicine to prevent HIV.  Get tested for HIV at least once in your life, whether you are at risk for HIV or not.  Cancer screening tests  Cervical cancer screening: If you have a cervix, begin getting regular cervical cancer screening tests starting at age 21.  Breast cancer scan (mammogram): If you've ever had breasts, begin having regular mammograms starting at age 40. This is a scan to check for breast cancer.  Colon cancer screening: It is important to start screening for colon cancer at age 45.  Have a colonoscopy test every 10 years (or more often if you're at risk) Or, ask your provider about stool tests like a FIT test every year or Cologuard test every 3 years.  To learn more about your testing options, visit:    .  For help making a decision, visit:   https://bit.ly/ox04655.  Prostate cancer screening test: If you have a prostate, ask your care team if a prostate cancer screening test (PSA) at age 55 is right for you.  Lung cancer screening: If you are a current or former smoker ages 50 to 80, ask your care team if ongoing lung cancer screenings are right for you.  For informational purposes only. Not to replace the advice of your health care provider. Copyright   2023 [employer]. All rights reserved. Clinically reviewed by the [facility] Transitions Program. [employer] 739874 - REV 01/24.       Erick Story is a 59 year old, presenting for the following:  Physical        9/23/2024     2:05 PM   Additional Questions   Roomed by [facility] Care Directive  Patient does not have a Health Care Directive or Living Will: Declined info     HPI    Moved to apartment.  Has changed eating to reduced processed foods.  Has increased walking- hour on the treadmill each day.  Weight down to 195 lbs from 212 lbs 9/20/23.        9/18/2024   General Health   How would you rate your overall physical health? Good   Feel stress (tense, anxious, or unable to sleep) Not at all            9/18/2024   Nutrition   Three or more servings of calcium each day? (!) I DON'T KNOW   Diet: Other   If other, please elaborate: Avoiding ultra processed food   How many servings of fruit and vegetables per day? 4 or more   How many sweetened beverages each day? 0-1            9/18/2024   Exercise   Days per week of moderate/strenous exercise 6 days   Average minutes spent exercising at this level 60 min            9/18/2024   Social Factors   Frequency of gathering with friends or relatives Once a week   Worry food won't last until get money to buy more No   Food not last or not have enough money for food? No   Do you have housing? (Housing is defined as stable permanent housing and does not include staying ouside in a  car, in a tent, in an abandoned building, in an overnight shelter, or couch-surfing.) Yes   Are you worried about losing your housing? No   Lack of transportation? No   Unable to get utilities (heat,electricity)? No            2024   Fall Risk   Fallen 2 or more times in the past year? No   Trouble with walking or balance? No             2024   Dental   Dentist two times every year? (!) NO            2024   TB Screening   Were you born outside of the [place]? No          Today's PHQ-9 Score:       2024     1:36 PM   PHQ-9 SCORE   PHQ-9 Total Score MyChart 3 (Minimal depression)   PHQ-9 Total Score 3         2024   Substance Use   Alcohol more than 3/day or more than 7/wk Not Applicable   Do you use any other substances recreationally? (!) CANNABIS PRODUCTS        Social History     Tobacco Use    Smoking status: Former     Current packs/day: 0.00     Average packs/day: 1 pack/day for 10.0 years (10.0 ttl pk-yrs)     Types: Cigarettes     Start date: 2010     Quit date: 2020     Years since quittin.0     Passive exposure: Past    Smokeless tobacco: Never   Substance Use Topics    Alcohol use: Not Currently     Comment: quit drinking 2020    Drug use: Yes     Types: Marijuana     Comment: Daily use          Mammogram Screening - Mammogram every 1-2 years updated in Health Maintenance based on mutual decision making        2024   STI Screening   New sexual partner(s) since last STI/HIV test? No        History of abnormal Pap smear: No - age 30-64 HPV with reflex Pap every 5 years recommended        2014    12:00 AM   PAP / HPV   PAP (Historical) ASC-US      ASCVD Risk   The 10-year ASCVD risk score (Natalie MCCORMICK, et al., 2019) is: 2.8%    Values used to calculate the score:      Age: 59 years      Sex: Female      Is Non- : No      Diabetic: No      Tobacco smoker: No      Systolic Blood Pressure: 105 mmHg      Is BP treated: No      HDL  "Cholesterol: 54 mg/dL      Total Cholesterol: 234 mg/dL    Fracture Risk Assessment Tool  Link to Frax Calculator  Use the information below to complete the Frax calculator  : 1965  Sex: female  Weight (kg): 88.5 kg (actual weight)  Height (cm): 163.2 cm  Previous Fragility Fracture:  No  History of parent with fractured hip:  No  Current Smoking:  No  Patient has been on glucocorticoids for more than 3 months (5mg/day or more): No  Rheumatoid Arthritis on Problem List:  No  Secondary Osteoporosis on Problem List:  No  Consumes 3 or more units of alcohol per day: No  Femoral Neck BMD (g/cm2)           Reviewed and updated as needed this visit by Provider   Tobacco       Fam Hx            Past Medical History:   Diagnosis Date    ASCUS favor benign 2015    neg HPV. cotest in 3 years    Choroidal malignant melanoma, left (H)     Depressive disorder     Hypertension      Past Surgical History:   Procedure Laterality Date    ANESTHESIA OUT OF OR MRI N/A 2023    Procedure: Anesthesia OUT OF THE OR Magnetic Resonance Imaging Brain;  Surgeon: GENERIC ANESTHESIA PROVIDER;  Location: [facility] OR    EXAM UNDER ANESTHESIA EYE(S) Left 2023    Procedure: Left Eye Immobilization @0730;  Surgeon: Madonna Corey MD;  Location: [facility] OR    PLACEMENT, HEAD FRAME N/A 2023    Procedure: Placement, Head Frame;  Surgeon: Kike Reynoso MD;  Location: [facility] OR    TOOTH EXTRACTION       Lab work is in process         Objective    Exam  /68 (BP Location: Right arm, Patient Position: Chair, Cuff Size: Adult Large)   Pulse 63   Temp 98.4  F (36.9  C) (Oral)   Resp 20   Ht 1.632 m (5' 4.25\")   Wt 88.5 kg (195 lb)   LMP  (LMP Unknown)   SpO2 100%   BMI 33.21 kg/m     Estimated body mass index is 33.21 kg/m  as calculated from the following:    Height as of this encounter: 1.632 m (5' 4.25\").    Weight as of this encounter: 88.5 kg (195 lb).    Physical Exam  GENERAL: alert and no " distress  EYES: Eyes grossly normal to inspection, PERRL and conjunctivae and sclerae normal  HENT: ear canals and TM's normal, nose and mouth without ulcers or lesions  NECK: no adenopathy, no asymmetry, masses, or scars  RESP: lungs clear to auscultation - no rales, rhonchi or wheezes  BREAST: normal without masses, tenderness or nipple discharge and no palpable axillary masses or adenopathy  CV: regular rate and rhythm, normal S1 S2, no S3 or S4, no murmur, click or rub, no peripheral edema  ABDOMEN: soft, nontender, no hepatosplenomegaly, no masses and bowel sounds normal  MS: no gross musculoskeletal defects noted, no edema  SKIN: no suspicious lesions or rashes  NEURO: Normal strength and tone, mentation intact and speech normal  PSYCH: mentation appears normal, affect normal/bright        Signed Electronically by: Radha Francisco DO    Answers submitted by the patient for this visit:  Patient Health Questionnaire (Submitted on 9/23/2024)  If you checked off any problems, how difficult have these problems made it for you to do your work, take care of things at home, or get along with other people?: Not difficult at all  PHQ9 TOTAL SCORE: 3

## 2024-09-23 NOTE — PATIENT INSTRUCTIONS
- Try stopping the amlodipine and see if your blood pressure stays below 130/80.  If not, then we'll restart the amlodipine.    Patient Education   Preventive Care Advice   This is general advice given by our system to help you stay healthy. However, your care team may have specific advice just for you. Please talk to your care team about your preventive care needs.  Nutrition  Eat 5 or more servings of fruits and vegetables each day.  Try wheat bread, brown rice and whole grain pasta (instead of white bread, rice, and pasta).  Get enough calcium and vitamin D. Check the label on foods and aim for 100% of the RDA (recommended daily allowance).  Lifestyle  Exercise at least 150 minutes each week  (30 minutes a day, 5 days a week).  Do muscle strengthening activities 2 days a week. These help control your weight and prevent disease.  No smoking.  Wear sunscreen to prevent skin cancer.  Have a dental exam and cleaning every 6 months.  Yearly exams  See your health care team every year to talk about:  Any changes in your health.  Any medicines your care team has prescribed.  Preventive care, family planning, and ways to prevent chronic diseases.  Shots (vaccines)   HPV shots (up to age 26), if you've never had them before.  Hepatitis B shots (up to age 59), if you've never had them before.  COVID-19 shot: Get this shot when it's due.  Flu shot: Get a flu shot every year.  Tetanus shot: Get a tetanus shot every 10 years.  Pneumococcal, hepatitis A, and RSV shots: Ask your care team if you need these based on your risk.  Shingles shot (for age 50 and up)  General health tests  Diabetes screening:  Starting at age 35, Get screened for diabetes at least every 3 years.  If you are younger than age 35, ask your care team if you should be screened for diabetes.  Cholesterol test: At age 39, start having a cholesterol test every 5 years, or more often if advised.  Bone density scan (DEXA): At age 50, ask your care team if you  should have this scan for osteoporosis (brittle bones).  Hepatitis C: Get tested at least once in your life.  STIs (sexually transmitted infections)  Before age 24: Ask your care team if you should be screened for STIs.  After age 24: Get screened for STIs if you're at risk. You are at risk for STIs (including HIV) if:  You are sexually active with more than one person.  You don't use condoms every time.  You or a partner was diagnosed with a sexually transmitted infection.  If you are at risk for HIV, ask about PrEP medicine to prevent HIV.  Get tested for HIV at least once in your life, whether you are at risk for HIV or not.  Cancer screening tests  Cervical cancer screening: If you have a cervix, begin getting regular cervical cancer screening tests starting at age 21.  Breast cancer scan (mammogram): If you've ever had breasts, begin having regular mammograms starting at age 40. This is a scan to check for breast cancer.  Colon cancer screening: It is important to start screening for colon cancer at age 45.  Have a colonoscopy test every 10 years (or more often if you're at risk) Or, ask your provider about stool tests like a FIT test every year or Cologuard test every 3 years.  To learn more about your testing options, visit:   .  For help making a decision, visit:   https://bit.ly/so23469.  Prostate cancer screening test: If you have a prostate, ask your care team if a prostate cancer screening test (PSA) at age 55 is right for you.  Lung cancer screening: If you are a current or former smoker ages 50 to 80, ask your care team if ongoing lung cancer screenings are right for you.  For informational purposes only. Not to replace the advice of your health care provider. Copyright   2023 [employer]. All rights reserved. Clinically reviewed by the [facility] Transitions Program. [employer] [id number] - REV 01/24.

## 2024-09-23 NOTE — NURSING NOTE
Prior to immunization administration, verified patients identity using patient s name and date of birth. Please see Immunization Activity for additional information.     Screening Questionnaire for Adult Immunization    Are you sick today?   No   Do you have allergies to medications, food, a vaccine component or latex?   Yes   Have you ever had a serious reaction after receiving a vaccination?   No   Do you have a long-term health problem with heart, lung, kidney, or metabolic disease (e.g., diabetes), asthma, a blood disorder, no spleen, complement component deficiency, a cochlear implant, or a spinal fluid leak?  Are you on long-term aspirin therapy?   No   Do you have cancer, leukemia, HIV/AIDS, or any other immune system problem?   Malignant melanoma of choroid of left eye    Do you have a parent, brother, or sister with an immune system problem?   No   In the past 6 months, have you taken medications that affect  your immune system, such as prednisone, other steroids, or anticancer drugs; drugs for the treatment of rheumatoid arthritis, Crohn s disease, or psoriasis; or have you had radiation treatments?   Don't Know   Have you had a seizure, or a brain or other nervous system problem?   No   During the past year, have you received a transfusion of blood or blood    products, or been given immune (gamma) globulin or antiviral drug?   No   For women: Are you pregnant or is there a chance you could become       pregnant during the next month?   No   Have you received any vaccinations in the past 4 weeks?   No     Immunization questionnaire was positive for at least one answer.      Patient instructed to remain in clinic for 15 minutes afterwards, and to report any adverse reactions.     Screening performed by Naya GUTIERRES MA on 9/23/2024 at 3:10 PM.

## 2024-09-24 LAB
HPV HR 12 DNA CVX QL NAA+PROBE: NEGATIVE
HPV16 DNA CVX QL NAA+PROBE: NEGATIVE
HPV18 DNA CVX QL NAA+PROBE: NEGATIVE
HUMAN PAPILLOMA VIRUS FINAL DIAGNOSIS: NORMAL

## 2024-09-26 LAB
BKR AP ASSOCIATED HPV REPORT: NORMAL
BKR LAB AP GYN ADEQUACY: NORMAL
BKR LAB AP GYN INTERPRETATION: NORMAL
BKR LAB AP PREVIOUS ABNL DX: NORMAL
BKR LAB AP PREVIOUS ABNORMAL: NORMAL
PATH REPORT.COMMENTS IMP SPEC: NORMAL
PATH REPORT.COMMENTS IMP SPEC: NORMAL
PATH REPORT.RELEVANT HX SPEC: NORMAL

## 2024-10-07 LAB — NONINV COLON CA DNA+OCC BLD SCRN STL QL: NEGATIVE

## 2024-11-25 ENCOUNTER — OFFICE VISIT (OUTPATIENT)
Dept: OPHTHALMOLOGY | Facility: CLINIC | Age: 59
End: 2024-11-25
Attending: OPHTHALMOLOGY
Payer: COMMERCIAL

## 2024-11-25 DIAGNOSIS — C69.32 MALIGNANT MELANOMA OF CHOROID OF LEFT EYE (H): Primary | ICD-10-CM

## 2024-11-25 PROCEDURE — 250N000011 HC RX IP 250 OP 636: Performed by: OPHTHALMOLOGY

## 2024-11-25 PROCEDURE — 76510 OPH US DX B-SCAN&QUAN A-SCAN: CPT | Performed by: OPHTHALMOLOGY

## 2024-11-25 PROCEDURE — 92250 FUNDUS PHOTOGRAPHY W/I&R: CPT | Performed by: OPHTHALMOLOGY

## 2024-11-25 PROCEDURE — 92134 CPTRZ OPH DX IMG PST SGM RTA: CPT | Performed by: OPHTHALMOLOGY

## 2024-11-25 PROCEDURE — 99213 OFFICE O/P EST LOW 20 MIN: CPT | Mod: 25 | Performed by: OPHTHALMOLOGY

## 2024-11-25 PROCEDURE — 99207 FUNDUS PHOTOS OU (BOTH EYES): CPT | Mod: 26 | Performed by: OPHTHALMOLOGY

## 2024-11-25 PROCEDURE — 67028 INJECTION EYE DRUG: CPT | Mod: LT | Performed by: OPHTHALMOLOGY

## 2024-11-25 RX ADMIN — Medication 1.25 MG: at 09:42

## 2024-11-25 ASSESSMENT — VISUAL ACUITY
CORRECTION_TYPE: GLASSES
OD_CC+: +2
OD_CC: 20/25
METHOD: SNELLEN - LINEAR
OS_CC: 20/20

## 2024-11-25 ASSESSMENT — TONOMETRY
OS_IOP_MMHG: 12
IOP_METHOD: TONOPEN
OD_IOP_MMHG: 17

## 2024-11-25 ASSESSMENT — REFRACTION_WEARINGRX
OD_SPHERE: -3.00
OS_AXIS: 011
OS_CYLINDER: +0.50
OD_CYLINDER: +0.25
OS_SPHERE: -2.75
OD_AXIS: 173

## 2024-11-25 ASSESSMENT — EXTERNAL EXAM - RIGHT EYE: OD_EXAM: NORMAL

## 2024-11-25 ASSESSMENT — SLIT LAMP EXAM - LIDS
COMMENTS: NORMAL
COMMENTS: NORMAL

## 2024-11-25 ASSESSMENT — CUP TO DISC RATIO: OS_RATIO: 0.3

## 2024-11-25 ASSESSMENT — EXTERNAL EXAM - LEFT EYE: OS_EXAM: NORMAL

## 2024-11-25 NOTE — NURSING NOTE
Chief Complaints and History of Present Illnesses   Patient presents with    Follow Up     CMM OS     Chief Complaint(s) and History of Present Illness(es)       Follow Up              Comments: CMM OS              Comments    Pt states no change in VA since last visit  States floaters and light wave same as last visit   No eye pain or redness    Jayne Glynn COT 8:37 AM November 25, 2024

## 2024-11-25 NOTE — PROGRESS NOTES
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, when closes eyes does not see images      No DM, no HTn, no prior CA    PAST OCULAR SURGERY  Gamma Knife OS 7/17/23        RETINAL IMAGING:  OCT 07/29/24   OD - (3/2024) macula normal, PHF attached  OS - mild SUBRETINAL FLUID SN macula, PHF attached   periphery - elevated lesion  with SRF, choroid abnormal juxtafoveal      U/S OS  A-scan - medium reflective  B-scan - elevated lesion no extension, +choroidal excavation,  size 4.17mm x 11.58T x 10.33L (06/12/23)->4.11 x 11.92T x 13.57L(10/27/23) -> 4.01 x 12.40T x 12.12L (3/2024) -> 4.00 x 12.04T x 12.05L(7/2024) -> 3.53 x 12.12T x 12.12L (11/2024)      ASSESSMENT & PLAN    # CMM left eye s/p Gamma Knife  7/17/2023    - substantial size growth 2021 to 2023 (images viewed on patient's phone, will upload to [facility])  - no Bx obtained     - last U/S 11/2024 improving size   - next U/S in  4 month interval (~3/2024)          # radiation retinopathy OS     - plan Avastin to prevent radiation retinopathy   - started Avastin 11/17/23     - new DBH noted 3/2024 present 11/2024     - last injection Avastin(#3) 7/29/2024  (4 months)   - inject Avastin today   - next injection 4 month interval      Return in about 4 months (around 3/25/2025) for NO Dilation, OCT OU, Injection OS, [employer] Photo.           ATTESTATION     Attending Physician Attestation:      Complete documentation of historical and exam elements from today's encounter can be found in the full encounter summary report (not reduplicated in this progress note).  I personally obtained the chief complaint(s) and history of present " illness.  I confirmed and edited as necessary the review of systems, past medical/surgical history, family history, social history, and examination findings as documented by others; and I examined the patient myself.  I personally reviewed the relevant tests, images, and reports as documented above.  I formulated and edited as necessary the assessment and plan and discussed the findings and management plan with the patient and family    Madonna Corey MD, PhD  , Vitreoretinal Surgery  Department of Ophthalmology  [facility]

## 2025-01-24 ENCOUNTER — ANCILLARY PROCEDURE (OUTPATIENT)
Dept: MRI IMAGING | Facility: CLINIC | Age: 60
End: 2025-01-24
Attending: PHYSICIAN ASSISTANT
Payer: COMMERCIAL

## 2025-01-24 ENCOUNTER — ANCILLARY PROCEDURE (OUTPATIENT)
Dept: CT IMAGING | Facility: CLINIC | Age: 60
End: 2025-01-24
Attending: PHYSICIAN ASSISTANT
Payer: COMMERCIAL

## 2025-01-24 ENCOUNTER — LAB (OUTPATIENT)
Dept: LAB | Facility: CLINIC | Age: 60
End: 2025-01-24
Payer: COMMERCIAL

## 2025-01-24 DIAGNOSIS — C69.32 MALIGNANT MELANOMA OF CHOROID OF LEFT EYE (H): ICD-10-CM

## 2025-01-24 LAB
ALBUMIN SERPL BCG-MCNC: 4.2 G/DL (ref 3.5–5.2)
ALP SERPL-CCNC: 70 U/L (ref 40–150)
ALT SERPL W P-5'-P-CCNC: 15 U/L (ref 0–50)
ANION GAP SERPL CALCULATED.3IONS-SCNC: 6 MMOL/L (ref 7–15)
AST SERPL W P-5'-P-CCNC: 19 U/L (ref 0–45)
BASOPHILS # BLD AUTO: 0.1 10E3/UL (ref 0–0.2)
BASOPHILS NFR BLD AUTO: 1 %
BILIRUB SERPL-MCNC: 0.6 MG/DL
BUN SERPL-MCNC: 11.3 MG/DL (ref 8–23)
CALCIUM SERPL-MCNC: 9.3 MG/DL (ref 8.8–10.4)
CHLORIDE SERPL-SCNC: 105 MMOL/L (ref 98–107)
CREAT SERPL-MCNC: 0.75 MG/DL (ref 0.51–0.95)
EGFRCR SERPLBLD CKD-EPI 2021: 90 ML/MIN/1.73M2
EOSINOPHIL # BLD AUTO: 0.1 10E3/UL (ref 0–0.7)
EOSINOPHIL NFR BLD AUTO: 2 %
ERYTHROCYTE [DISTWIDTH] IN BLOOD BY AUTOMATED COUNT: 13.5 % (ref 10–15)
GLUCOSE SERPL-MCNC: 96 MG/DL (ref 70–99)
HCO3 SERPL-SCNC: 30 MMOL/L (ref 22–29)
HCT VFR BLD AUTO: 40.5 % (ref 35–47)
HGB BLD-MCNC: 13.4 G/DL (ref 11.7–15.7)
IMM GRANULOCYTES # BLD: 0 10E3/UL
IMM GRANULOCYTES NFR BLD: 0 %
LYMPHOCYTES # BLD AUTO: 1.2 10E3/UL (ref 0.8–5.3)
LYMPHOCYTES NFR BLD AUTO: 23 %
MCH RBC QN AUTO: 28.9 PG (ref 26.5–33)
MCHC RBC AUTO-ENTMCNC: 33.1 G/DL (ref 31.5–36.5)
MCV RBC AUTO: 88 FL (ref 78–100)
MONOCYTES # BLD AUTO: 0.4 10E3/UL (ref 0–1.3)
MONOCYTES NFR BLD AUTO: 7 %
NEUTROPHILS # BLD AUTO: 3.6 10E3/UL (ref 1.6–8.3)
NEUTROPHILS NFR BLD AUTO: 66 %
NRBC # BLD AUTO: 0 10E3/UL
NRBC BLD AUTO-RTO: 0 /100
PLATELET # BLD AUTO: 178 10E3/UL (ref 150–450)
POTASSIUM SERPL-SCNC: 3.8 MMOL/L (ref 3.4–5.3)
PROT SERPL-MCNC: 6.7 G/DL (ref 6.4–8.3)
RBC # BLD AUTO: 4.63 10E6/UL (ref 3.8–5.2)
SODIUM SERPL-SCNC: 141 MMOL/L (ref 135–145)
WBC # BLD AUTO: 5.4 10E3/UL (ref 4–11)

## 2025-01-24 PROCEDURE — 36415 COLL VENOUS BLD VENIPUNCTURE: CPT | Performed by: PATHOLOGY

## 2025-01-24 PROCEDURE — 85025 COMPLETE CBC W/AUTO DIFF WBC: CPT | Performed by: PATHOLOGY

## 2025-01-24 PROCEDURE — A9581 GADOXETATE DISODIUM INJ: HCPCS | Performed by: RADIOLOGY

## 2025-01-24 PROCEDURE — 74183 MRI ABD W/O CNTR FLWD CNTR: CPT | Performed by: RADIOLOGY

## 2025-01-24 PROCEDURE — 80053 COMPREHEN METABOLIC PANEL: CPT | Performed by: PATHOLOGY

## 2025-01-24 PROCEDURE — 71250 CT THORAX DX C-: CPT | Performed by: RADIOLOGY

## 2025-01-24 NOTE — DISCHARGE INSTRUCTIONS
MRI Contrast Discharge Instructions    The IV contrast you received today will pass out of your body in your  urine. This will happen in the next 24 hours. You will not feel this process.  Your urine will not change color.    Drink at least 4 extra glasses of water or juice today (unless your doctor  has restricted your fluids). This reduces the stress on your kidneys.  You may take your regular medicines.    If you are on dialysis: It is best to have dialysis today.    If you have a reaction: Most reactions happen right away. If you have  any new symptoms after leaving the hospital (such as hives or swelling),  call your hospital at the correct number below. Or call your family doctor.  If you have breathing distress or wheezing, call 911.    Special instructions: ***    I have read and understand the above information.    Signature:______________________________________ Date:___________    Staff:__________________________________________ Date:___________     Time:__________    [facility] Departments:    ___[place]: [phone]  ___[facility]: [phone]  ___[place]: [phone] ___[facility]: [phone]  ___[facility]: [phone]  ___[facility]: [phone]  ___[phone]  ___[facility]: [phone]  ___[phone]

## 2025-01-28 ENCOUNTER — VIRTUAL VISIT (OUTPATIENT)
Dept: ONCOLOGY | Facility: CLINIC | Age: 60
End: 2025-01-28
Attending: STUDENT IN AN ORGANIZED HEALTH CARE EDUCATION/TRAINING PROGRAM
Payer: COMMERCIAL

## 2025-01-28 VITALS — HEIGHT: 65 IN | WEIGHT: 188 LBS | BODY MASS INDEX: 31.32 KG/M2

## 2025-01-28 DIAGNOSIS — C69.32 MALIGNANT MELANOMA OF CHOROID OF LEFT EYE (H): Primary | ICD-10-CM

## 2025-01-28 PROCEDURE — 98005 SYNCH AUDIO-VIDEO EST LOW 20: CPT | Performed by: STUDENT IN AN ORGANIZED HEALTH CARE EDUCATION/TRAINING PROGRAM

## 2025-01-28 PROCEDURE — G2211 COMPLEX E/M VISIT ADD ON: HCPCS | Performed by: STUDENT IN AN ORGANIZED HEALTH CARE EDUCATION/TRAINING PROGRAM

## 2025-01-28 ASSESSMENT — PAIN SCALES - GENERAL: PAINLEVEL_OUTOF10: MILD PAIN (1)

## 2025-01-28 NOTE — NURSING NOTE
Current patient location: [address] [place] [address]   [facility] [id number]    Is the patient currently in the state of [place]? YES    Visit mode: VIDEO    If the visit is dropped, the patient can be reconnected by:VIDEO VISIT: Text to cell phone:   Telephone Information:   Mobile [phone]       Will anyone else be joining the visit? NO  (If patient encounters technical issues they should call [phone])    Are changes needed to the allergy or medication list? Pt stated no changes to allergies and Pt stated no med changes    Are refills needed on medications prescribed by this physician? NO    Rooming Documentation:  Questionnaire(s) completed    Reason for visit: IRISH MEZA

## 2025-01-28 NOTE — LETTER
1/28/2025      Porsha Ledezma  [address]  [facility] [address]      Dear Colleague,    Thank you for referring your patient, Porsha Ledezma, to the [facility]. Please see a copy of my visit note below.    Virtual Visit Details    Type of service:  Video Visit     Originating Location (pt. Location): Home    Distant Location (provider location):  On-site  Platform used for Video Visit: 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  May 2021 eye exam showed a nevus in the left eye   May 2023 return eye exam, nevus increased in size   6/12/23 pt saw Dr. Corey. OCT shows elevated lesion with SRF, and juxtafoveal location. B-scan ultrasound showed elevated lesion no extension, + choroidal excavation Size: 4.17mm x 11.58 mmT x 10.33 mmL.   6/22/23 CT CAP: indeterminate 2 cm left adrenal nodule, indeterminate bilateral renal masses measuring up to 1 cm (MRI Abd recommended).  No other evidence of metastasis.  7/5/23 Pelvic ultrasound: 2.9 cm left ovarian cyst without solid components or septations. No follow up required per SRU guidelines.  7/17/23 gamma knife to left eye melanoma      HPI:   Porsha Ledezma is a 59 year old female with a history of a left sided uveal melanoma treated with gamma knife radiotherapy in July 2023.      PMHx  None    SocHx  Originally from [place]  Went to grad school in [place], moved to [facility] in late 20s   early in the  "pandemic    Interval History:     Previously followed by Dr. Schmitt. Last seen by Kimberly Putnam 7/8/24, planned to continue on every 6 month surveillance.     \"Blind spot\" in left lateral visual field is gradually progressive but overall pretty stable  No new vision changes  Quit eating ultra-processed food about a year ago, lost some weight but feels well overall  Quit drinking in 2020  Energy level is good  No abdominal pain  No breathing concerns    1/24/25 CT chest: No evidence of metastatic disease in the chest.     1/24/25 MR liver:   1. No evidence of metastatic disease in the abdomen.  2. Unchanged bilateral enhancing renal lesions, indeterminate for  neoplasm. Recommend continued attention on follow-up.      Exam:   Ht 1.638 m (5' 4.5\")   Wt 85.3 kg (188 lb)   LMP  (LMP Unknown)   BMI 31.77 kg/m      Gen: Well appearing, NAD  Resp: Comfortable on room air      Labs:   Reviewed in chart.     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      # Uveal melanoma, left  -T2a lesion  -Gamma knife 7/17/2023, no biopsy performed    As this was my first visit with Ms. Ledezma, we reviewed her cancer history including her diagnosis, stage, and the natural history of this disease.  We reviewed the role for surveillance imaging given the risk of distant recurrence.  CT chest and MR liver without evidence of metastatic disease.  Unchanged indeterminate renal lesions.     She is intermediate risk for recurrence based on size criteria alone.  We do not have molecular data to guide our decision making further.    Plan:  --Repeat MR liver and CT chest in 6 months        Sha Roblero MD PhD   of " Medicine  Division of Hematology, Oncology and Transplantation    ---  20 minutes were spent on the date of the encounter performing chart review, history and exam, documentation, and further activities as noted above.     The longitudinal plan of care for the diagnosis(es)/condition(s) as documented were addressed during this visit. Due to the added complexity in care, I will continue to support Porsha in the subsequent management and with ongoing continuity of care.          Again, thank you for allowing me to participate in the care of your patient.        Sincerely,        Sha Roblero MD    Electronically signed

## 2025-01-28 NOTE — PROGRESS NOTES
"Virtual Visit Details    Type of service:  Video Visit     Originating Location (pt. 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  May 2021 eye exam showed a nevus in the left eye   May 2023 return eye exam, nevus increased in size   6/12/23 pt saw Dr. Corey. OCT shows elevated lesion with SRF, and juxtafoveal location. B-scan ultrasound showed elevated lesion no extension, + choroidal excavation Size: 4.17mm x 11.58 mmT x 10.33 mmL.   6/22/23 CT CAP: indeterminate 2 cm left adrenal nodule, indeterminate bilateral renal masses measuring up to 1 cm (MRI Abd recommended).  No other evidence of metastasis.  7/5/23 Pelvic ultrasound: 2.9 cm left ovarian cyst without solid components or septations. No follow up required per SRU guidelines.  7/17/23 gamma knife to left eye melanoma      HPI:   Porsha Ledezma is a 59 year old female with a history of a left sided uveal melanoma treated with gamma knife radiotherapy in July 2023.      PMHx  None    SocHx  Originally from [place]  Went to grad school in [place], moved to [facility] in late 20s   early in the pandemic    Interval History:     Previously followed by Dr. Schmitt. Last seen by Kimberly Putnam 7/8/24, planned to continue on every 6 month surveillance.     \"Blind spot\" in left lateral visual field is gradually progressive but overall pretty " "stable  No new vision changes  Quit eating ultra-processed food about a year ago, lost some weight but feels well overall  Quit drinking in 2020  Energy level is good  No abdominal pain  No breathing concerns    1/24/25 CT chest: No evidence of metastatic disease in the chest.     1/24/25 MR liver:   1. No evidence of metastatic disease in the abdomen.  2. Unchanged bilateral enhancing renal lesions, indeterminate for  neoplasm. Recommend continued attention on follow-up.      Exam:   Ht 1.638 m (5' 4.5\")   Wt 85.3 kg (188 lb)   LMP  (LMP Unknown)   BMI 31.77 kg/m      Gen: Well appearing, NAD  Resp: Comfortable on room air      Labs:   Reviewed in chart.     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      # Uveal melanoma, left  -T2a lesion  -Gamma knife 7/17/2023, no biopsy performed    As this was my first visit with Ms. Ledezma, we reviewed her cancer history including her diagnosis, stage, and the natural history of this disease.  We reviewed the role for surveillance imaging given the risk of distant recurrence.  CT chest and MR liver without evidence of metastatic disease.  Unchanged indeterminate renal lesions.     She is intermediate risk for recurrence based on size criteria alone.  We do not have molecular data to guide our decision making further.    Plan:  --Repeat MR liver and CT chest in 6 months        Sha Roblero MD PhD   of Medicine  Division of Hematology, Oncology and Transplantation    ---  20 minutes were spent on the date of the encounter performing chart review, history and exam, documentation, and further activities as noted above.     The longitudinal plan of care " for the diagnosis(es)/condition(s) as documented were addressed during this visit. Due to the added complexity in care, I will continue to support Porsha in the subsequent management and with ongoing continuity of care.

## 2025-03-18 DIAGNOSIS — C69.32 MALIGNANT MELANOMA OF CHOROID OF LEFT EYE (H): Primary | ICD-10-CM

## 2025-03-25 ENCOUNTER — OFFICE VISIT (OUTPATIENT)
Dept: OPHTHALMOLOGY | Facility: CLINIC | Age: 60
End: 2025-03-25
Attending: OPHTHALMOLOGY
Payer: COMMERCIAL

## 2025-03-25 DIAGNOSIS — H35.89 POST-RADIATION RETINOPATHY, INITIAL ENCOUNTER: Primary | ICD-10-CM

## 2025-03-25 DIAGNOSIS — C69.32 MALIGNANT MELANOMA OF CHOROID OF LEFT EYE (H): ICD-10-CM

## 2025-03-25 DIAGNOSIS — T66.XXXA POST-RADIATION RETINOPATHY, INITIAL ENCOUNTER: Primary | ICD-10-CM

## 2025-03-25 PROCEDURE — 67028 INJECTION EYE DRUG: CPT | Mod: LT | Performed by: OPHTHALMOLOGY

## 2025-03-25 PROCEDURE — 250N000011 HC RX IP 250 OP 636: Performed by: OPHTHALMOLOGY

## 2025-03-25 PROCEDURE — 92134 CPTRZ OPH DX IMG PST SGM RTA: CPT | Performed by: OPHTHALMOLOGY

## 2025-03-25 PROCEDURE — 92250 FUNDUS PHOTOGRAPHY W/I&R: CPT | Performed by: OPHTHALMOLOGY

## 2025-03-25 RX ADMIN — Medication 1.25 MG: at 09:23

## 2025-03-25 ASSESSMENT — VISUAL ACUITY
OD_SC: 20/15
OD_SC+: -2
CORRECTION_TYPE: GLASSES
OS_SC: 20/15
METHOD: SNELLEN - LINEAR
OS_SC+: -2

## 2025-03-25 ASSESSMENT — TONOMETRY
OD_IOP_MMHG: 18
IOP_METHOD: TONOPEN
OS_IOP_MMHG: 20

## 2025-03-25 NOTE — PROGRESS NOTES
"CC -   CMM OS    INTERVAL HISTORY -  no changes, LV 11/2024      PMH -   Porsha AIKEN Darien is a  59 year old year-old patient with history of CMM OS diagnosed 2023    Had eye exam in 5/2021 showing nevus OS, returned 5/2023 and lesion much larger  No vision symptoms  Recalls was told about \"shadow\" in eye in years past as well    patient diagnosed with \"aphantasia\" -- inability to mentally visualize things, when closes eyes does not see images      No DM, no HTn, no prior CA    PAST OCULAR SURGERY  Gamma Knife OS 7/17/23        RETINAL IMAGING:  OCT 03/25/25   OD - (3/2024) macula normal, PHF attached  OS - mild SUBRETINAL FLUID SN macula, PHF attached   (prior ) periphery - elevated lesion  with SRF, choroid abnormal juxtafoveal      U/S OS  A-scan - medium reflective  B-scan - elevated lesion no extension, +choroidal excavation,  size 4.17mm x 11.58T x 10.33L (06/12/23)->4.11 x 11.92T x 13.57L(10/27/23) -> 4.01 x 12.40T x 12.12L (3/2024) -> 4.00 x 12.04T x 12.05L(7/2024) -> 3.53 x 12.12T x 12.12L (11/2024)      ASSESSMENT & PLAN    # CMM left eye s/p Gamma Knife  7/17/2023    - substantial size growth 2021 to 2023 (images viewed on patient's phone, will upload to [facility])  - no Bx obtained     - last U/S 11/2024 improving size   - next U/S in  4 month interval (~7/2025) - missed today 3/2024          # radiation retinopathy OS     - plan Avastin to prevent radiation retinopathy   - started Avastin 11/17/23     - new DBH noted 3/2024 present 11/2024     - last injection Avastin(#4) 11/25/2024  (4 months)   - inject Avastin today   - next injection 4 month interval      Return in about 4 months (around 7/25/2025) for DFE OU, OCT OU, Optos Photo, Injection OS, Avastin, US OS.           ATTESTATION     Attending Physician Attestation:      Complete documentation of historical and exam elements from today's encounter can be found in the full encounter summary report (not reduplicated in this progress note).  I personally " obtained the chief complaint(s) and history of present illness.  I confirmed and edited as necessary the review of systems, past medical/surgical history, family history, social history, and examination findings as documented by others; and I examined the patient myself.  I personally reviewed the relevant tests, images, and reports as documented above.  I formulated and edited as necessary the assessment and plan and discussed the findings and management plan with the patient and family    Madonna Corey MD, PhD  , Vitreoretinal Surgery  Department of Ophthalmology  [facility]

## 2025-03-25 NOTE — NURSING NOTE
"Chief Complaints and History of Present Illnesses   Patient presents with    Follow Up     Malignant melanoma of choroid of left eye     Chief Complaint(s) and History of Present Illness(es)       Follow Up              Comments: Malignant melanoma of choroid of left eye              Comments    Pt states \"I think the blind spot is a little bigger\" left eye   Flashes and floaters same as before   No eye pain or redness    Jayne Glynn COT 8:35 AM March 25, 2025                        "

## 2025-07-02 ENCOUNTER — DOCUMENTATION ONLY (OUTPATIENT)
Dept: ONCOLOGY | Facility: CLINIC | Age: 60
End: 2025-07-02
Payer: COMMERCIAL

## 2025-07-07 ENCOUNTER — ANCILLARY PROCEDURE (OUTPATIENT)
Dept: MRI IMAGING | Facility: CLINIC | Age: 60
End: 2025-07-07
Attending: STUDENT IN AN ORGANIZED HEALTH CARE EDUCATION/TRAINING PROGRAM
Payer: COMMERCIAL

## 2025-07-07 ENCOUNTER — ANCILLARY PROCEDURE (OUTPATIENT)
Dept: CT IMAGING | Facility: CLINIC | Age: 60
End: 2025-07-07
Attending: STUDENT IN AN ORGANIZED HEALTH CARE EDUCATION/TRAINING PROGRAM
Payer: COMMERCIAL

## 2025-07-07 ENCOUNTER — LAB (OUTPATIENT)
Dept: LAB | Facility: CLINIC | Age: 60
End: 2025-07-07
Attending: STUDENT IN AN ORGANIZED HEALTH CARE EDUCATION/TRAINING PROGRAM
Payer: COMMERCIAL

## 2025-07-07 DIAGNOSIS — C69.32 MALIGNANT MELANOMA OF CHOROID OF LEFT EYE (H): ICD-10-CM

## 2025-07-07 LAB
ALBUMIN SERPL BCG-MCNC: 4.2 G/DL (ref 3.5–5.2)
ALP SERPL-CCNC: 64 U/L (ref 40–150)
ALT SERPL W P-5'-P-CCNC: 11 U/L (ref 0–50)
ANION GAP SERPL CALCULATED.3IONS-SCNC: 10 MMOL/L (ref 7–15)
AST SERPL W P-5'-P-CCNC: 15 U/L (ref 0–45)
BILIRUB SERPL-MCNC: 0.7 MG/DL
BUN SERPL-MCNC: 9.6 MG/DL (ref 8–23)
CALCIUM SERPL-MCNC: 9.4 MG/DL (ref 8.8–10.4)
CHLORIDE SERPL-SCNC: 106 MMOL/L (ref 98–107)
CREAT SERPL-MCNC: 0.75 MG/DL (ref 0.51–0.95)
EGFRCR SERPLBLD CKD-EPI 2021: 90 ML/MIN/1.73M2
GLUCOSE SERPL-MCNC: 98 MG/DL (ref 70–99)
HCO3 SERPL-SCNC: 26 MMOL/L (ref 22–29)
POTASSIUM SERPL-SCNC: 4.1 MMOL/L (ref 3.4–5.3)
PROT SERPL-MCNC: 6.5 G/DL (ref 6.4–8.3)
SODIUM SERPL-SCNC: 142 MMOL/L (ref 135–145)

## 2025-07-07 PROCEDURE — A9581 GADOXETATE DISODIUM INJ: HCPCS | Mod: JW | Performed by: RADIOLOGY

## 2025-07-07 PROCEDURE — 36415 COLL VENOUS BLD VENIPUNCTURE: CPT | Performed by: PATHOLOGY

## 2025-07-07 PROCEDURE — 74183 MRI ABD W/O CNTR FLWD CNTR: CPT | Performed by: RADIOLOGY

## 2025-07-07 PROCEDURE — 80053 COMPREHEN METABOLIC PANEL: CPT | Performed by: PATHOLOGY

## 2025-07-07 PROCEDURE — 71260 CT THORAX DX C+: CPT | Performed by: RADIOLOGY

## 2025-07-07 RX ORDER — IOPAMIDOL 755 MG/ML
92 INJECTION, SOLUTION INTRAVASCULAR ONCE
Status: COMPLETED | OUTPATIENT
Start: 2025-07-07 | End: 2025-07-07

## 2025-07-07 RX ADMIN — IOPAMIDOL 92 ML: 755 INJECTION, SOLUTION INTRAVASCULAR at 08:29

## 2025-07-07 NOTE — DISCHARGE INSTRUCTIONS

## 2025-07-07 NOTE — DISCHARGE INSTRUCTIONS
MRI Contrast Discharge Instructions    The IV contrast you received today will pass out of your body in your  urine. This will happen in the next 24 hours. You will not feel this process.  Your urine will not change color.    Drink at least 4 extra glasses of water or juice today (unless your doctor  has restricted your fluids). This reduces the stress on your kidneys.  You may take your regular medicines.    If you are on dialysis: It is best to have dialysis today.    If you have a reaction: Most reactions happen right away. If you have  any new symptoms after leaving the hospital (such as hives or swelling),  call your hospital at the correct number below. Or call your family doctor.  If you have breathing distress or wheezing, call 911.    Special instructions: ***    I have read and understand the above information.    Signature:______________________________________ Date:___________    Staff:__________________________________________ Date:___________     Time:__________    [facility] Departments:    ___[place]: [phone]  ___[facility]: [phone]  ___[place]: [phone] ___[facility]: [phone]  ___[facility]: [phone]  ___[facility]: [phone]  ___[phone]  ___[facility]: [phone]  ___[phone]

## 2025-07-12 ENCOUNTER — HEALTH MAINTENANCE LETTER (OUTPATIENT)
Age: 60
End: 2025-07-12

## 2025-07-15 ENCOUNTER — VIRTUAL VISIT (OUTPATIENT)
Dept: ONCOLOGY | Facility: CLINIC | Age: 60
End: 2025-07-15
Attending: STUDENT IN AN ORGANIZED HEALTH CARE EDUCATION/TRAINING PROGRAM
Payer: COMMERCIAL

## 2025-07-15 VITALS
WEIGHT: 179 LBS | HEIGHT: 64 IN | DIASTOLIC BLOOD PRESSURE: 77 MMHG | BODY MASS INDEX: 30.56 KG/M2 | SYSTOLIC BLOOD PRESSURE: 159 MMHG

## 2025-07-15 DIAGNOSIS — C69.32 MALIGNANT MELANOMA OF CHOROID OF LEFT EYE (H): Primary | ICD-10-CM

## 2025-07-15 PROCEDURE — 98005 SYNCH AUDIO-VIDEO EST LOW 20: CPT | Performed by: STUDENT IN AN ORGANIZED HEALTH CARE EDUCATION/TRAINING PROGRAM

## 2025-07-15 PROCEDURE — G2211 COMPLEX E/M VISIT ADD ON: HCPCS | Performed by: STUDENT IN AN ORGANIZED HEALTH CARE EDUCATION/TRAINING PROGRAM

## 2025-07-15 ASSESSMENT — PAIN SCALES - GENERAL: PAINLEVEL_OUTOF10: NO PAIN (0)

## 2025-07-15 NOTE — NURSING NOTE
Is the patient currently in the state of [place]? YES    Visit mode: VIDEO    If the visit is dropped, the patient can be reconnected by:VIDEO VISIT: Send to e-mail at: [email or web address]    Will anyone else be joining the visit? NO  (If patient encounters technical issues they should call [phone]    Are changes needed to the allergy or medication list? No    Are refills needed on medications prescribed by this physician? NO    Rooming Documentation:  Questionnaire(s) completed    Reason for visit: Video Visit (Follow Up )    Nessa MEZA

## 2025-07-15 NOTE — PROGRESS NOTES
Virtual Visit Details    Type of service:  Video Visit     Originating Location (pt. 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  --May 2021 eye exam showed a nevus in the left eye   --May 2023 return eye exam, nevus increased in size   --6/12/23 pt saw Dr. Corey. OCT shows elevated lesion with SRF, and juxtafoveal location. B-scan ultrasound showed elevated lesion no extension, + choroidal excavation Size: 4.17mm x 11.58 mmT x 10.33 mmL.   --6/22/23 CT CAP: indeterminate 2 cm left adrenal nodule, indeterminate bilateral renal masses measuring up to 1 cm (MRI Abd recommended).  No other evidence of metastasis.  --7/5/23 Pelvic ultrasound: 2.9 cm left ovarian cyst without solid components or septations. No follow up required per SRU guidelines.  --7/17/23 gamma knife to left eye melanoma      HPI:   Porsha Ledezma is a 59 year old female with a history of a left sided uveal melanoma treated with gamma knife radiotherapy in July 2023.      PMHx  None    SocHx  Originally from [place]  Went to grad school in [place], moved to [facility] in late 20s   early in the pandemic    Interval History:     Last seen by me 1/28/25, imaging RAUL, continued on surveillance.     3/25/25 ocular oncology follow up (Madhav):   7/7/25 CT chest:   No evidence of pulmonary metastatic disease. Mild  "ectasia  mid ascending thoracic aorta. Please review separate abdominal MRI for  further assessment especially regarding the liver. Unchanged left  adrenal nodule at the lateral limb measuring approximately 18 mm.    7/7/25 MR liver:   1. No evidence of metastatic disease in the abdomen.  2. No significant change (since 7/14/2023) of indeterminate bilateral  renal lesions. Recommend continued attention on follow-up. Papillary  carcinoma remains the differential.    Feels generally well  Energy level is good  Depression symptoms are well controlled  Continues to lose weight with dietary change and regular exercise  No longer on BP meds  Ocular oncology visit next week      Exam:   BP (!) 159/77   Ht 1.632 m (5' 4.25\")   Wt 81.2 kg (179 lb)   LMP  (LMP Unknown)   BMI 30.49 kg/m      Gen: Well appearing, NAD  Resp: Comfortable on room air      Labs:   Reviewed in chart.     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      # Uveal melanoma, left  -T2a lesion  -Gamma knife 7/17/2023, no biopsy performed    Return visit today to review her surveillance imaging. Continues to do very well clinically. Imaging findings without evidence of recurrence    Plan:  --Repeat MR liver and CT chest in 6 months      # bilateral indeterminate renal lesions  -12 mm L upper pole and 6 mm R upper pole lesions, stable  -seen by Urology in July 2024, plan for surveillance    Plan:  --continue to monitor        Sha Roblero MD PhD   of Medicine  Division of Hematology, Oncology and Transplantation    ---  20 minutes were spent on the date of the encounter performing chart review, history and exam, documentation, and " further activities as noted above.     The longitudinal plan of care for the diagnosis(es)/condition(s) as documented were addressed during this visit. Due to the added complexity in care, I will continue to support Porsha in the subsequent management and with ongoing continuity of care.

## 2025-07-15 NOTE — LETTER
7/15/2025      Porsha Ledezma  [address]  [facility] [address]      Dear Colleague,    Thank you for referring your patient, Porsha Ledezma, to the [facility]. Please see a copy of my visit note below.    Virtual Visit Details    Type of service:  Video Visit     Originating Location (pt. 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  --May 2021 eye exam showed a nevus in the left eye   --May 2023 return eye exam, nevus increased in size   --6/12/23 pt saw Dr. Corey. OCT shows elevated lesion with SRF, and juxtafoveal location. B-scan ultrasound showed elevated lesion no extension, + choroidal excavation Size: 4.17mm x 11.58 mmT x 10.33 mmL.   --6/22/23 CT CAP: indeterminate 2 cm left adrenal nodule, indeterminate bilateral renal masses measuring up to 1 cm (MRI Abd recommended).  No other evidence of metastasis.  --7/5/23 Pelvic ultrasound: 2.9 cm left ovarian cyst without solid components or septations. No follow up required per SRU guidelines.  --7/17/23 gamma knife to left eye melanoma      HPI:   Porsha Ledezma is a 59 year old female with a history of a left sided uveal melanoma treated with gamma knife radiotherapy in July 2023.      PMHx  None    SocHx  Originally from [place]  Went to [employer] school in [place], moved to [facility] in  "late 20s   early in the pandemic    Interval History:     Last seen by me 1/28/25, imaging RAUL, continued on surveillance.     3/25/25 ocular oncology follow up (Madhav):   7/7/25 CT chest:   No evidence of pulmonary metastatic disease. Mild ectasia  mid ascending thoracic aorta. Please review separate abdominal MRI for  further assessment especially regarding the liver. Unchanged left  adrenal nodule at the lateral limb measuring approximately 18 mm.    7/7/25 MR liver:   1. No evidence of metastatic disease in the abdomen.  2. No significant change (since 7/14/2023) of indeterminate bilateral  renal lesions. Recommend continued attention on follow-up. Papillary  carcinoma remains the differential.    Feels generally well  Energy level is good  Depression symptoms are well controlled  Continues to lose weight with dietary change and regular exercise  No longer on BP meds  Ocular oncology visit next week      Exam:   BP (!) 159/77   Ht 1.632 m (5' 4.25\")   Wt 81.2 kg (179 lb)   LMP  (LMP Unknown)   BMI 30.49 kg/m      Gen: Well appearing, NAD  Resp: Comfortable on room air      Labs:   Reviewed in chart.     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      # Uveal melanoma, left  -T2a lesion  -Gamma knife 7/17/2023, no biopsy performed    Return visit today to review her surveillance imaging. Continues to do very well clinically. Imaging findings without evidence of recurrence    Plan:  --Repeat MR liver and CT chest in 6 months      # bilateral indeterminate renal lesions  -12 mm L upper pole and 6 mm R upper pole lesions, stable  -seen by Urology in July 2024, plan for " surveillance    Plan:  --continue to monitor        Sha Roblero MD PhD   of Medicine  Division of Hematology, Oncology and Transplantation    ---  20 minutes were spent on the date of the encounter performing chart review, history and exam, documentation, and further activities as noted above.     The longitudinal plan of care for the diagnosis(es)/condition(s) as documented were addressed during this visit. Due to the added complexity in care, I will continue to support Porsha in the subsequent management and with ongoing continuity of care.      Again, thank you for allowing me to participate in the care of your patient.        Sincerely,        Sha Roblero MD    Electronically signed

## 2025-07-23 DIAGNOSIS — C69.32 MALIGNANT MELANOMA OF CHOROID OF LEFT EYE (H): Primary | ICD-10-CM

## 2025-07-28 ENCOUNTER — OFFICE VISIT (OUTPATIENT)
Dept: OPHTHALMOLOGY | Facility: CLINIC | Age: 60
End: 2025-07-28
Attending: OPHTHALMOLOGY
Payer: COMMERCIAL

## 2025-07-28 DIAGNOSIS — H35.89 POST-RADIATION RETINOPATHY, INITIAL ENCOUNTER: Primary | ICD-10-CM

## 2025-07-28 DIAGNOSIS — C69.32 MALIGNANT MELANOMA OF CHOROID OF LEFT EYE (H): ICD-10-CM

## 2025-07-28 DIAGNOSIS — T66.XXXA POST-RADIATION RETINOPATHY, INITIAL ENCOUNTER: Primary | ICD-10-CM

## 2025-07-28 PROCEDURE — 250N000011 HC RX IP 250 OP 636: Performed by: OPHTHALMOLOGY

## 2025-07-28 PROCEDURE — 99207 FUNDUS PHOTOS OU (BOTH EYES): CPT | Mod: 26 | Performed by: OPHTHALMOLOGY

## 2025-07-28 PROCEDURE — 99213 OFFICE O/P EST LOW 20 MIN: CPT | Mod: 25 | Performed by: OPHTHALMOLOGY

## 2025-07-28 PROCEDURE — 92250 FUNDUS PHOTOGRAPHY W/I&R: CPT | Performed by: OPHTHALMOLOGY

## 2025-07-28 PROCEDURE — 67028 INJECTION EYE DRUG: CPT | Mod: LT | Performed by: OPHTHALMOLOGY

## 2025-07-28 PROCEDURE — 76510 OPH US DX B-SCAN&QUAN A-SCAN: CPT | Performed by: OPHTHALMOLOGY

## 2025-07-28 PROCEDURE — 92134 CPTRZ OPH DX IMG PST SGM RTA: CPT | Performed by: OPHTHALMOLOGY

## 2025-07-28 PROCEDURE — G0463 HOSPITAL OUTPT CLINIC VISIT: HCPCS | Performed by: OPHTHALMOLOGY

## 2025-07-28 RX ADMIN — Medication 1.25 MG: at 09:45

## 2025-07-28 ASSESSMENT — CUP TO DISC RATIO
OD_RATIO: 0.3
OS_RATIO: 0.3

## 2025-07-28 ASSESSMENT — VISUAL ACUITY
OD_CC: 20/15
CORRECTION_TYPE: GLASSES
METHOD: SNELLEN - LINEAR
OS_CC: 20/15

## 2025-07-28 ASSESSMENT — REFRACTION_WEARINGRX
OS_SPHERE: -2.75
OD_SPHERE: -3.00
OD_CYLINDER: +0.25
OD_AXIS: 173
OS_AXIS: 011
OS_CYLINDER: +0.50

## 2025-07-28 ASSESSMENT — TONOMETRY
IOP_METHOD: TONOPEN
OS_IOP_MMHG: 16
OD_IOP_MMHG: 18

## 2025-07-28 ASSESSMENT — CONF VISUAL FIELD
OS_INFERIOR_TEMPORAL_RESTRICTION: 3
OD_NORMAL: 1
OD_SUPERIOR_TEMPORAL_RESTRICTION: 0
OD_INFERIOR_TEMPORAL_RESTRICTION: 0
METHOD: COUNTING FINGERS
OD_SUPERIOR_NASAL_RESTRICTION: 0
OD_INFERIOR_NASAL_RESTRICTION: 0

## 2025-07-28 ASSESSMENT — EXTERNAL EXAM - LEFT EYE: OS_EXAM: NORMAL

## 2025-07-28 ASSESSMENT — SLIT LAMP EXAM - LIDS
COMMENTS: NORMAL
COMMENTS: NORMAL

## 2025-07-28 ASSESSMENT — EXTERNAL EXAM - RIGHT EYE: OD_EXAM: NORMAL

## 2025-07-28 NOTE — PROGRESS NOTES
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, when closes eyes does not see images      No DM, no HTn, no prior CA    PAST OCULAR SURGERY  Gamma Knife OS 7/17/23        RETINAL IMAGING:  OCT 07/28/25    OD - (3/2024) macula normal, PHF attached  OS - macula normal,  PHF attached   (prior ) periphery - elevated lesion  with SRF, choroid abnormal juxtafoveal      U/S OS  A-scan - medium reflective  B-scan - elevated lesion no extension, +choroidal excavation,  size 4.17mm x 11.58T x 10.33L (06/12/23)->4.11 x 11.92T x 13.57L(10/27/23) -> 4.01 x 12.40T x 12.12L (3/2024) -> 4.00 x 12.04T x 12.05L(7/2024) -> 3.53 x 12.12T x 12.12L (11/2024) ->  3.81mm x 11.74T x 11.81L (07/2025)      ASSESSMENT & PLAN    # CMM left eye s/p Gamma Knife  7/17/2023    - substantial size growth 2021 to 2023 (images viewed on patient's phone, will upload to [facility])  - no Bx obtained     - Optos stable c/t 2024   - last U/S 7/2025 likely stable size, mesurement different d/t different imager, boundaries indistinct     - next U/S in  4 -6 month interval (~11/2025)          # radiation retinopathy OS     - plan Avastin to prevent radiation retinopathy   - started Avastin 11/17/23     - new DBH noted 3/2024 present 11/2024     - last injection Avastin(#5) 3/2025   (4 months)   - inject Avastin today   - next injection 4 month interval      Return in about 4 months (around 11/28/2025) for NO Dilation, OCT OU, Optos Photo, Injection OS, Avastin, US OS.           ATTESTATION     Attending Physician Attestation:      Complete documentation of historical and exam elements from today's encounter can " be found in the full encounter summary report (not reduplicated in this progress note).  I personally obtained the chief complaint(s) and history of present illness.  I confirmed and edited as necessary the review of systems, past medical/surgical history, family history, social history, and examination findings as documented by others; and I examined the patient myself.  I personally reviewed the relevant tests, images, and reports as documented above.  I formulated and edited as necessary the assessment and plan and discussed the findings and management plan with the patient and family    Madonna Corey MD, PhD  , Vitreoretinal Surgery  Department of Ophthalmology  [facility]

## 2025-08-25 ENCOUNTER — PATIENT OUTREACH (OUTPATIENT)
Dept: CARE COORDINATION | Facility: CLINIC | Age: 60
End: 2025-08-25
Payer: COMMERCIAL

## (undated) DEVICE — PREP PAD ALCOHOL 6818

## (undated) DEVICE — DRSG GAUZE 4X4" TRAY 6939

## (undated) DEVICE — SYR 10ML FINGER CONTROL W/O NDL 309695

## (undated) DEVICE — LINEN TOWEL PACK X5 5464

## (undated) DEVICE — NDL BLUNT FILL 18GA 1 1/2" 305064

## (undated) DEVICE — CATH TRAY FOLEY 16FR BARDEX W/DRAIN BAG STATLOCK 300316A

## (undated) DEVICE — SU SILK 0 SH 30" K834H

## (undated) DEVICE — EYE PREP BETADINE 5% SOLUTION 30ML 0065-0411-30

## (undated) DEVICE — EYE NDL RETROBULBAR 25GA 60-111637

## (undated) DEVICE — Device

## (undated) DEVICE — DRSG TEGADERM 2 3/8X2 3/4" 1624W

## (undated) RX ORDER — BUPIVACAINE HYDROCHLORIDE 7.5 MG/ML
INJECTION, SOLUTION EPIDURAL; RETROBULBAR
Status: DISPENSED
Start: 2023-07-17

## (undated) RX ORDER — LIDOCAINE HYDROCHLORIDE 40 MG/ML
INJECTION, SOLUTION RETROBULBAR
Status: DISPENSED
Start: 2023-07-17

## (undated) RX ORDER — HYDROMORPHONE HCL IN WATER/PF 6 MG/30 ML
PATIENT CONTROLLED ANALGESIA SYRINGE INTRAVENOUS
Status: DISPENSED
Start: 2023-07-17

## (undated) RX ORDER — FENTANYL CITRATE 50 UG/ML
INJECTION, SOLUTION INTRAMUSCULAR; INTRAVENOUS
Status: DISPENSED
Start: 2023-07-17

## (undated) RX ORDER — ACETAMINOPHEN 325 MG/1
TABLET ORAL
Status: DISPENSED
Start: 2023-07-17

## (undated) RX ORDER — ERYTHROMYCIN 5 MG/G
OINTMENT OPHTHALMIC
Status: DISPENSED
Start: 2023-07-17